# Patient Record
Sex: FEMALE | Race: WHITE | Employment: FULL TIME | URBAN - METROPOLITAN AREA
[De-identification: names, ages, dates, MRNs, and addresses within clinical notes are randomized per-mention and may not be internally consistent; named-entity substitution may affect disease eponyms.]

---

## 2017-01-12 RX ORDER — PHENTERMINE HYDROCHLORIDE 37.5 MG/1
TABLET ORAL
Qty: 100 TAB | Refills: 1 | Status: SHIPPED | OUTPATIENT
Start: 2017-01-12 | End: 2017-04-20

## 2017-04-20 DIAGNOSIS — E03.9 ACQUIRED HYPOTHYROIDISM: Primary | ICD-10-CM

## 2017-04-20 RX ORDER — LEVOTHYROXINE SODIUM 88 UG/1
TABLET ORAL
Qty: 115 TAB | Refills: 3 | Status: SHIPPED | OUTPATIENT
Start: 2017-04-20 | End: 2018-01-31 | Stop reason: DRUGHIGH

## 2017-05-02 LAB
T4 FREE SERPL-MCNC: 1.41 NG/DL (ref 0.82–1.77)
TSH SERPL DL<=0.005 MIU/L-ACNC: 1.08 UIU/ML (ref 0.45–4.5)

## 2017-05-03 ENCOUNTER — OFFICE VISIT (OUTPATIENT)
Dept: ENDOCRINOLOGY | Age: 37
End: 2017-05-03

## 2017-05-03 VITALS
WEIGHT: 171.8 LBS | HEART RATE: 61 BPM | HEIGHT: 63 IN | DIASTOLIC BLOOD PRESSURE: 71 MMHG | BODY MASS INDEX: 30.44 KG/M2 | SYSTOLIC BLOOD PRESSURE: 110 MMHG

## 2017-05-03 DIAGNOSIS — E03.9 ACQUIRED HYPOTHYROIDISM: Primary | ICD-10-CM

## 2017-05-03 DIAGNOSIS — E66.9 OBESITY (BMI 30.0-34.9): ICD-10-CM

## 2017-05-03 NOTE — MR AVS SNAPSHOT
Visit Information Date & Time Provider Department Dept. Phone Encounter #  
 5/3/2017  9:50 AM Man Gandhi MD [facility] [phone] [id number] Follow-up Instructions Return in about 9 months (around 2/3/2018). Upcoming Health Maintenance Date Due DTaP/Tdap/Td series (1 - Tdap) 2/8/2002 PAP AKA CERVICAL CYTOLOGY 2/8/2002 INFLUENZA AGE 9 TO ADULT 8/1/2017 Allergies as of 5/3/2017  Review Complete On: 5/3/2017 By: Man Gandhi MD  
  
 Severity Noted Reaction Type Reactions Erythromycin  07/28/2015    Rash Current Immunizations  Never Reviewed No immunizations on file. Not reviewed this visit You Were Diagnosed With   
  
 Codes Comments Acquired hypothyroidism    -  Primary ICD-10-CM: E03.9 ICD-9-CM: 244.9 Obesity (BMI 30.0-34.9)     ICD-10-CM: X40.9 ICD-9-CM: 278.00 Vitals BP Pulse Height(growth percentile) Weight(growth percentile) BMI Smoking Status 110/71 (BP 1 Location: Left arm, BP Patient Position: Sitting) 61 5' 3\" (1.6 m) 171 lb 12.8 oz (77.9 kg) 30.43 kg/m2 Never Smoker BMI and BSA Data Body Mass Index Body Surface Area  
 30.43 kg/m 2 1.86 m 2 Preferred Pharmacy Pharmacy Name Phone [address] [facility]. [phone] Your Updated Medication List  
  
   
This list is accurate as of: 5/3/17 10:18 AM.  Always use your most recent med list.  
  
  
  
  
 LEVOXYL 88 mcg tablet Generic drug:  levothyroxine Take 1 tab on Mon-Fri and 2 tabs on Sat and Sun--BRAND MEDICALLY NECESSARY PRENATAL PO Take  by mouth. Follow-up Instructions Return in about 9 months (around 2/3/2018). Patient Instructions 1) Your TSH is a thyroid test.  Your level is 1.08 which is normal and at goal of 0.5-2.0.   This test goes opposite of your thyroid dose and suggests your dose of levoxyl is working well so keep taking 1 tab on Mon-Fri and 2 tabs on Sat and Sun.  
 
2) Plan on having your OB's office repeat your thyroid levels in the 2nd and 3rd trimester and fax me a copy of the results and I'll e-mail you to see if we need a dose change. 3) I'll be in touch throughout the pregnancy and we'll determine a dose to settle on once you deliver to stay on until you come back after delivery. 4) Plan on going to the lab 1 week prior to your next visit and the order will already be in the system. Introducing [facility] & HEALTH SERVICES! Dear Fredo Gay: 
Thank you for requesting a [employer] account. Our records indicate that you already have an active [employer] account. You can access your account anytime at [email or web address] Did you know that you can access your hospital and ER discharge instructions at any time in [employer]? You can also review all of your test results from your hospital stay or ER visit. Additional Information If you have questions, please visit the Frequently Asked Questions section of the [employer] website at [email or web address] Remember, [employer] is NOT to be used for urgent needs. For medical emergencies, dial 911. Now available from your iPhone and Android! Please provide this summary of care documentation to your next provider. Your primary care clinician is listed as Geovany Sneed. If you have any questions after today's visit, please call [phone].

## 2017-05-03 NOTE — PROGRESS NOTES
Chief Complaint   Patient presents with    Thyroid Problem     pcp and pharmacy confirmed     History of Present Illness: Chadwick Aguilar is a 40 y.o. female here for follow up of thyroid. Weight up 3 lbs since last visit in 11/16. Had had 3 positive pregnancy tests and due to see OB's office next week. Has been off the phentermine when she was concerned she could be pregnant and we e-mailed about her pregnancy 2 weeks ago and I told her to take 1 tab on Mon-Fri and 2 tabs on Sat and Sun and has been doing this. She has missed about 5-7 days when she lost her bottle before e-mailing me on 4/20/17 but aside from this, has gotten her dose everyday. No morning sickness. Has been more tired. Has had more constipated and is taking colace to help. Has been on pnv since 4/9/17 and takes this in the morning and levoxyl is at night. No chest pain or palpitations. No hair loss or dry skin. Has felt her hands and feet and nose feel cold but her body can feel normal.    Current Outpatient Prescriptions   Medication Sig    PNV HT.71/RASLVPS FUM/FOLIC AC (PRENATAL PO) Take  by mouth.  LEVOXYL 88 mcg tablet Take 1 tab on Mon-Fri and 2 tabs on Sat and Sun--BRAND MEDICALLY NECESSARY     No current facility-administered medications for this visit. Allergies   Allergen Reactions    Erythromycin Rash     Review of Systems:  - Cardiovascular: no chest pain  - Neurological: no tremors  - Integumentary: skin is normal    Physical Examination:  Blood pressure 110/71, pulse 61, height 5' 3\" (1.6 m), weight 171 lb 12.8 oz (77.9 kg).   - General: pleasant, no distress, good eye contact   - Neck: small goiter  - Cardiovascular: regular, normal rate, nl s1 and s2, no m/r/g   - Integumentary: skin is normal, no edema  - Neurological: reflexes 2+ at biceps, no tremors  - Psychiatric: normal mood and affect    Data Reviewed:   Component      Latest Ref Rng & Units 5/1/2017 5/1/2017           4:30 PM  4:30 PM   TSH      0.450 - 4.500 uIU/mL  1.080   T4, Free      0.82 - 1.77 ng/dL 1.41        Assessment/Plan:     1. Acquired hypothyroidism: When she was 16, recalls having a lot of fatigue and was checked and found to have hypothyroidism and has been on medication ever since. Had been on levothyroxine 50 mcg daily for years and may have been on brand levoxyl as a teenager but not recently. Was taking this at the same time as her calcium and her OCP and also drinks coffee within 30 minutes. TSH was 2.59 in 7/15 and TPO ab was normal.  Increased her dose to 75 mcg daily and advised her to move the thyroid med away from the calcium and coffee to improve absorption and she has been taking at bedtime. TSH down to 1.36 in 1/16 and changed to Levoxyl and TSH 1.57 in 7/16. Down to 1.49 in 11/16 so increased dose to 88 mcg daily to try and get TSH < 1 to help with conception and became pregnant in 4/17 and I increased her dose to 1 tab on Mon-Fri and 2 tabs on Sat-Sun and TSH 1.08 in 5/17 so will stay on this dose and ask her OB's office to repeat her labs in the 2nd and 3rd trimester and fax me the results to see if a dose change is needed during pregnancy. - check TSH and free T4 in 2nd and 3rd trimester and prior to next visit  - cont levoxyl 88 mcg 1 tab on Mon-Fri and 2 tabs on Sat-Sun         2. Obesity (BMI 30.0-34. 9): Had lost 12 lbs with starting phentermine in 7/16 but gained some back over Thanksgiving. Has stopped since being pregnant in 4/17.  - off phentermine 1/2 of 37.5 mg twice daily        Patient Instructions   1) Your TSH is a thyroid test.  Your level is 1.08 which is normal and at goal of 0.5-2.0.   This test goes opposite of your thyroid dose and suggests your dose of levoxyl is working well so keep taking 1 tab on Mon-Fri and 2 tabs on Sat and Sun.     2) Plan on having your OB's office repeat your thyroid levels in the 2nd and 3rd trimester and fax me a copy of the results and I'll e-mail you to see if we need a dose change. 3) I'll be in touch throughout the pregnancy and we'll determine a dose to settle on once you deliver to stay on until you come back after delivery. 4) Plan on going to the lab 1 week prior to your next visit and the order will already be in the system. Follow-up Disposition:  Return in about 9 months (around 2/3/2018).     Copy sent to:  Dr. Manjeet Jeffries Page [phone]

## 2017-05-03 NOTE — PATIENT INSTRUCTIONS
1) Your TSH is a thyroid test.  Your level is 1.08 which is normal and at goal of 0.5-2.0. This test goes opposite of your thyroid dose and suggests your dose of levoxyl is working well so keep taking 1 tab on Mon-Fri and 2 tabs on Sat and Sun.     2) Plan on having your OB's office repeat your thyroid levels in the 2nd and 3rd trimester and fax me a copy of the results and I'll e-mail you to see if we need a dose change. 3) I'll be in touch throughout the pregnancy and we'll determine a dose to settle on once you deliver to stay on until you come back after delivery. 4) Plan on going to the lab 1 week prior to your next visit and the order will already be in the system.

## 2018-01-23 ENCOUNTER — TELEPHONE (OUTPATIENT)
Dept: ENDOCRINOLOGY | Age: 38
End: 2018-01-23

## 2018-01-23 DIAGNOSIS — E03.9 ACQUIRED HYPOTHYROIDISM: Primary | ICD-10-CM

## 2018-01-23 NOTE — TELEPHONE ENCOUNTER
Patient was notified that her labs have been ordered and to give Dr. Leandro Huitron name when she goes there. She stated okay and voiced understanding.

## 2018-01-23 NOTE — TELEPHONE ENCOUNTER
----- Message from Brandon Kelley sent at 1/23/2018  8:34 AM EST -----  Regarding: Dr. Riccardo Arce request for her blood work order to come in the mail so that she can go to and have it done before her appt on 1/31. Her best contact number is [phone].

## 2018-01-31 ENCOUNTER — OFFICE VISIT (OUTPATIENT)
Dept: ENDOCRINOLOGY | Age: 38
End: 2018-01-31

## 2018-01-31 VITALS
HEIGHT: 63 IN | BODY MASS INDEX: 32.46 KG/M2 | WEIGHT: 183.2 LBS | SYSTOLIC BLOOD PRESSURE: 107 MMHG | DIASTOLIC BLOOD PRESSURE: 71 MMHG | HEART RATE: 65 BPM

## 2018-01-31 DIAGNOSIS — E03.9 ACQUIRED HYPOTHYROIDISM: Primary | ICD-10-CM

## 2018-01-31 DIAGNOSIS — E66.9 OBESITY (BMI 30.0-34.9): ICD-10-CM

## 2018-01-31 LAB
T4 FREE SERPL-MCNC: 1.36 NG/DL (ref 0.82–1.77)
TSH SERPL DL<=0.005 MIU/L-ACNC: 0.46 UIU/ML (ref 0.45–4.5)

## 2018-01-31 RX ORDER — LEVOTHYROXINE SODIUM 112 UG/1
112 TABLET ORAL
Qty: 90 TAB | Refills: 3 | Status: SHIPPED | OUTPATIENT
Start: 2018-01-31 | End: 2018-07-25 | Stop reason: SDUPTHER

## 2018-01-31 NOTE — PROGRESS NOTES
Chief Complaint   Patient presents with    Thyroid Problem     pcp and pharmacy confirmed     History of Present Illness: Noé Haddad is a 40 y.o. female here for follow up of thyroid. Weight up 12 lbs since last visit in 5/17. She had a miscarriage at the end of May and then became pregnant again and miscarried in mid-October. Had some type of saline exam of her uterus that didn't show any abnormalities in the uterine wall to explain her miscarriages. Has continued on the same dose of levoxyl since last visit and taking the pill at bedtime. Extremities and nose can remain cold but this is unchanged. Bowels are regular. No hair loss or brittle nails or dry skin. Is currently ovulating and she and her  are still trying to conceive. Current Outpatient Prescriptions   Medication Sig    DOCOSAHEXANOIC ACID/EPA (FISH OIL PO) Take 1,000 mg by mouth. 8 tablets daily    PNV OR.17/GNCNROA FUM/FOLIC AC (PRENATAL PO) Take  by mouth.  LEVOXYL 88 mcg tablet Take 1 tab on Mon-Fri and 2 tabs on Sat and Sun--BRAND MEDICALLY NECESSARY     No current facility-administered medications for this visit. Allergies   Allergen Reactions    Erythromycin Rash     Review of Systems:  - Cardiovascular: no chest pain  - Neurological: no tremors  - Integumentary: skin is normal    Physical Examination:  Blood pressure 107/71, pulse 65, height 5' 3\" (1.6 m), weight 183 lb 3.2 oz (83.1 kg).   - General: pleasant, no distress, good eye contact   - Neck: small goiter  - Cardiovascular: regular, normal rate, nl s1 and s2, no m/r/g   - Respiratory: clear to auscultation bilaterally   - Integumentary: skin is normal, no edema  - Neurological: reflexes 2+ at biceps, no tremors  - Psychiatric: normal mood and affect    Data Reviewed:   Component      Latest Ref Rng & Units 1/30/2018 1/30/2018           9:18 AM  9:18 AM   T4, Free      0.82 - 1.77 ng/dL  1.36   TSH      0.450 - 4.500 uIU/mL 0.464        Assessment/Plan: 1. Acquired hypothyroidism: When she was 16, recalls having a lot of fatigue and was checked and found to have hypothyroidism and has been on medication ever since. Had been on levothyroxine 50 mcg daily for years and may have been on brand levoxyl as a teenager but not recently. Was taking this at the same time as her calcium and her OCP and also drinks coffee within 30 minutes. TSH was 2.59 in 7/15 and TPO ab was normal.  Increased her dose to 75 mcg daily and advised her to move the thyroid med away from the calcium and coffee to improve absorption and she has been taking at bedtime. TSH down to 1.36 in 1/16 and changed to Levoxyl and TSH 1.57 in 7/16. Down to 1.49 in 11/16 so increased dose to 88 mcg daily to try and get TSH < 1 to help with conception and became pregnant in 4/17 and I increased her dose to 1 tab on Mon-Fri and 2 tabs on Sat-Sun and TSH 1.08 in 5/17 so had her stay on this dose but then miscarried at the end of 5/17. Got pregnant again in 9/17 and miscarried in 10/17. TSH down to 0.46 in 1/18 so her current dose is still correct and not the reason for her miscarriages. Will change to 112 mcg tabs to take 1 tab daily to get a consistent dose in her system and allow for titration on the weekends when she becomes pregnant again. - check TSH and free T4 prior to next visit  - change levoxyl to 112 mcg at bedtime        2. Obesity (BMI 30.0-34. 9): Had lost 12 lbs with starting phentermine in 7/16 but gained some back over Thanksgiving. Has stopped since being pregnant in 4/17. Will stay off while still trying to conceive. - off phentermine 1/2 of 37.5 mg twice daily        Patient Instructions   1) TSH is a thyroid test.  Your level is 0.46 which is normal and at goal of 0.45-2.0. This test goes opposite of your thyroid dose and suggests your dose of levoxyl is perfect but for consistency of daily dosing I will change to the 112 mcg tabs to take 1 tab 7 days a week.  This will be ready for  at the pharmacy today. 2) When you become pregnant again, plan on taking an additional tab on Sat and Sun and let me know and we can determine when to repeat your levels. 3) There is currently an order in the [employer] system to use to check your next set of labs and just ask to have them drawn under my name. Follow-up Disposition:  Return in about 6 months (around 7/31/2018).     Copy sent to:  Dr. Bryanna Bates Page [phone]

## 2018-01-31 NOTE — PATIENT INSTRUCTIONS
1) TSH is a thyroid test.  Your level is 0.46 which is normal and at goal of 0.45-2.0. This test goes opposite of your thyroid dose and suggests your dose of levoxyl is perfect but for consistency of daily dosing I will change to the 112 mcg tabs to take 1 tab 7 days a week. This will be ready for  at the pharmacy today. 2) When you become pregnant again, plan on taking an additional tab on Sat and Sun and let me know and we can determine when to repeat your levels. 3) There is currently an order in the [employer] system to use to check your next set of labs and just ask to have them drawn under my name.

## 2018-01-31 NOTE — MR AVS SNAPSHOT
[address] 
[phone] Patient: [facility] MRN: [id number] UPH:[id number] Visit Information Date & Time Provider Department Dept. Phone Encounter #  
 1/31/2018  3:10 PM Zain Salazar, [address] [facility] [phone] Follow-up Instructions Return in about 6 months (around 7/31/2018). Upcoming Health Maintenance Date Due DTaP/Tdap/Td series (1 - Tdap) 2/8/2002 PAP AKA CERVICAL CYTOLOGY 2/8/2002 Influenza Age 5 to Adult 8/1/2017 Allergies as of 1/31/2018  Review Complete On: 1/31/2018 By: Zain Salazar MD  
  
 Severity Noted Reaction Type Reactions Erythromycin  07/28/2015    Rash Current Immunizations  Never Reviewed No immunizations on file. Not reviewed this visit You Were Diagnosed With   
  
 Codes Comments Acquired hypothyroidism    -  Primary ICD-10-CM: E03.9 ICD-9-CM: 244.9 Obesity (BMI 30.0-34.9)     ICD-10-CM: Z15.0 ICD-9-CM: 278.00 Vitals BP Pulse Height(growth percentile) Weight(growth percentile) BMI Smoking Status 107/71 65 5' 3\" (1.6 m) 183 lb 3.2 oz (83.1 kg) 32.45 kg/m2 Never Smoker Vitals History BMI and BSA Data Body Mass Index Body Surface Area  
 32.45 kg/m 2 1.92 m 2 Preferred Pharmacy Pharmacy Name Phone [address] [facility] AT [facility] 89. [phone] Your Updated Medication List  
  
   
This list is accurate as of: 1/31/18  4:30 PM.  Always use your most recent med list.  
  
  
  
  
 FISH OIL PO Take 1,000 mg by mouth. 8 tablets daily LEVOXYL 112 mcg tablet Generic drug:  levothyroxine Take 1 Tab by mouth Daily (before breakfast). BRAND MEDICALLY NECESSARY PRENATAL PO Take  by mouth. Prescriptions Sent to Pharmacy Refills LEVOXYL 112 mcg tablet 3 Sig: Take 1 Tab by mouth Daily (before breakfast). BRAND MEDICALLY NECESSARY Class: Normal  
 Pharmacy: [email or web address] Amb[facility]io Condon  #: [phone] Route: Oral  
  
We Performed the Following T4, FREE [id number] CPT(R)] TSH 3RD GENERATION [41182 CPT(R)] Follow-up Instructions Return in about 6 months (around 7/31/2018). Patient Instructions 1) TSH is a thyroid test.  Your level is 0.46 which is normal and at goal of 0.45-2.0. This test goes opposite of your thyroid dose and suggests your dose of levoxyl is perfect but for consistency of daily dosing I will change to the 112 mcg tabs to take 1 tab 7 days a week. This will be ready for  at the pharmacy today. 2) When you become pregnant again, plan on taking an additional tab on Sat and Sun and let me know and we can determine when to repeat your levels. 3) There is currently an order in the [employer] system to use to check your next set of labs and just ask to have them drawn under my name. Introducing [facility] & HEALTH SERVICES! Dear Linn Cooney: 
Thank you for requesting a [employer] account. Our records indicate that you already have an active [employer] account. You can access your account anytime at [email or web address] Did you know that you can access your hospital and ER discharge instructions at any time in [employer]? You can also review all of your test results from your hospital stay or ER visit. Additional Information If you have questions, please visit the Frequently Asked Questions section of the [employer] website at [email or web address] Remember, [employer] is NOT to be used for urgent needs. For medical emergencies, dial 911. Now available from your iPhone and Android! Please provide this summary of care documentation to your next provider. Your primary care clinician is listed as Maryellen Peña. If you have any questions after today's visit, please call [phone].

## 2018-02-03 DIAGNOSIS — E03.9 ACQUIRED HYPOTHYROIDISM: ICD-10-CM

## 2018-02-03 DIAGNOSIS — E66.9 OBESITY (BMI 30.0-34.9): ICD-10-CM

## 2018-03-29 DIAGNOSIS — E66.9 OBESITY (BMI 30.0-34.9): Primary | ICD-10-CM

## 2018-04-12 ENCOUNTER — HOSPITAL ENCOUNTER (OUTPATIENT)
Dept: NUTRITION | Age: 38
Discharge: HOME | End: 2018-04-12

## 2018-06-27 ENCOUNTER — HOSPITAL ENCOUNTER (OUTPATIENT)
Dept: PHYSICAL THERAPY | Age: 38
Discharge: HOME | End: 2018-06-27
Payer: COMMERCIAL

## 2018-06-27 PROCEDURE — 97110 THERAPEUTIC EXERCISES: CPT | Performed by: PHYSICAL THERAPIST

## 2018-06-27 PROCEDURE — 97161 PT EVAL LOW COMPLEX 20 MIN: CPT | Performed by: PHYSICAL THERAPIST

## 2018-06-27 NOTE — PROGRESS NOTES
PT INITIAL EVALUATION NOTE 2-15    Patient Name: Delfin Morrow  Date:2018  : 1980  [x]  Patient  Verified  Payor: BLUE CECE / Plan: Siena Hickman [id number] PPO / Product Type: PPO /    In time:845a  Out time:940a  Total Treatment Time (min): 55  Visit #: 1     Treatment Area: Right shoulder pain [M25.511]    SUBJECTIVE  Pain Level (0-10 scale): 5/10  Any medication changes, allergies to medications, adverse drug reactions, diagnosis change, or new procedure performed?: [] No    [x] Yes (see summary sheet for update)  Subjective:     End of 2018 began to notice onset of right shoulder pain at rest, during activity and after activity depending. She stopped   She saw Dr Antoinette Ugalde and did some dry needling with slight success but then pain would return. Stopped doing kettle bell work due to pain and golfing. She does still work out with cardio and some resistance activity with some pain but she just pushes through. 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 MRI Indicate: Subscapularis, supraspinatus and infraspinatus tendonopathy, intersubstance subscapularis tear and AC degenerative changes    Right hand dominant    OBJECTIVE  Posture:  Rounded shoulder protracted scapula  Other Observations:  FMS Shoulder Mobility test: R 1   L 1  Functional  and Pinch:  --  Palpation: tenderness at Right supraspinatus, infraspinatus and subscapularis distal tendons, Ac joint    Right Shoulder ROM:  AROM   PROM   Flexion   155   120 P!    Extension  --   --   Abduction  155   160   Adduction  --   --   IR   Hand to T10  90   ER   Hand to T2  90    Joint Mobility Assessment: Glenohumeral: normal      Acromioclavicular: normal      Sternoclavicular: normal    Flexibility: --    UPPER QUARTER   MUSCLE STRENGTH  KEY       R  L  0 - No Contraction   Flexion  5  5  1 - Trace    Extension 5  5  2 - Poor    Abduction 5 P!  5  3 - Fair     IR  5  5  4 - Good    ER  5  5  5 - Normal       Neurological: Reflexes / Sensations: normal  Special Tests: Neer Impingement: positive  Roach-Daryl: negative      Scapular Reposition: negative Shoulder Abduction: negative     AC Crossover: positive        15 min Therapeutic Exercise:  [x] See flow sheet : demonstration and preparation of HEP   Rationale: increase ROM, increase strength, improve coordination, improve balance and increase proprioception to improve the patients ability to reach overhead            With   [x] TE   [] TA   [] neuro   [] other: Patient Education: [x] Review HEP    [] Progressed/Changed HEP based on:   [] positioning   [] body mechanics   [] transfers   [] heat/ice application    [] other:        Other Objective/Functional Measures: --    Pain Level (0-10 scale) post treatment: 5/10      ASSESSMENT:      [x]  See Plan of Care      Ken Trejo PT, DPT 6/27/2018  8:54 AM

## 2018-06-28 NOTE — PROGRESS NOTES
[facility]  [id number] [address], [address] [place] [address]  Phone: [phone]  Fax: [phone]    Plan of Care/Statement of Necessity for Physical Therapy Services  2-15    Patient name: Harl Carrel  : 1980  Provider#: [id number]  Referral source: Referred, Self, MD; Radha Wells DC      Medical/Treatment Diagnosis: Right shoulder pain [M25.511]     Prior Hospitalization: see medical history     Comorbidities: none  Prior Level of Function: complete 20 minutes of exercise at least 3 times a week  Medications: Verified on Patient Summary List    Start of Care: 2018     Onset Date: 2018       The Plan of Care and following information is based on the information from the initial evaluation. Assessment/ bradshaw information: 41 y.o. Female with Right Shoulder pain secondary Subscapularis, supraspinatus and infraspinatus tendonopathy, intersubstance subscapularis tear and AC degenerative changes in presence of reduced rotator cuff and scapular stabilizer muscle performance and reduced shoulder mobility.     Evaluation Complexity History LOW Complexity : Zero comorbidities / personal factors that will impact the outcome / POC; Examination HIGH Complexity : 4+ Standardized tests and measures addressing body structure, function, activity limitation and / or participation in recreation  ;Presentation LOW Complexity : Stable, uncomplicated  ;Clinical Decision Making --  Overall Complexity Rating: LOW     Problem List: pain affecting function, decrease ROM, decrease strength, decrease ADL/ functional abilitiies, decrease activity tolerance and decrease flexibility/ joint mobility   Treatment Plan may include any combination of the following: Therapeutic exercise, Therapeutic activities, Neuromuscular re-education, Physical agent/modality, Gait/balance training, Manual therapy, Patient education and Self Care training  Patient / Family readiness to learn indicated by: asking questions  Persons(s) to be included in education: patient (P)  Barriers to Learning/Limitations: None  Patient Goal (s): resume use of right shoulder without pain  Patient Self Reported Health Status: good  Rehabilitation Potential: good    Long Term Goals: To be accomplished in 4-8 weeks:  1) Pt will be able to Reach above shoulder level without increase of pain  2) Pt will be able to reach behind back without pain  3) Pt will be able to lift >/= 30 lbs without shoulder pain  4) Pt will be able to sleep on involved side without waking due to pain  5) Pt will be independent with HEP      Frequency / Duration: Patient to be seen 2 times per week for 4-8 weeks. Patient/ Caregiver education and instruction: activity modification and exercises    [x]  Plan of care has been reviewed with KRISTI Cunningham PT, DPT 6/27/2018 11:21 PM    ________________________________________________________________________    I certify that the above Therapy Services are being furnished while the patient is under my care. I agree with the treatment plan and certify that this therapy is necessary.     [de-identified] Signature:____________________  Date:____________Time: _________

## 2018-07-03 ENCOUNTER — HOSPITAL ENCOUNTER (OUTPATIENT)
Dept: PHYSICAL THERAPY | Age: 38
Discharge: HOME | End: 2018-07-03
Payer: COMMERCIAL

## 2018-07-03 PROCEDURE — 97140 MANUAL THERAPY 1/> REGIONS: CPT | Performed by: PHYSICAL THERAPIST

## 2018-07-03 PROCEDURE — 97110 THERAPEUTIC EXERCISES: CPT | Performed by: PHYSICAL THERAPIST

## 2018-07-03 NOTE — PROGRESS NOTES
PT DAILY TREATMENT NOTE - [facility] 2-15    Patient Name: Patricio Gowers  Date:7/3/2018  : 1980  [x]  Patient  Verified  Payor: BLUE CROSS / Plan: Siean Hickman [id number] PPO / Product Type: PPO /    In time:200p  Out time:300p  Total Treatment Time (min): 60  Total Timed Codes (min): 60  1:1 Treatment Time ( only): --   Visit #: 2     Treatment Area: Pain in right shoulder [M25.511]    SUBJECTIVE  Pain Level (0-10 scale): 4/10  Any medication changes, allergies to medications, adverse drug reactions, diagnosis change, or new procedure performed?: [x] No    [] Yes (see summary sheet for update)  Subjective functional status/changes:   [] No changes reported  Pt states that she has noticed some improvement in pain with activity since she has done her HEP.     OBJECTIVE    Modality rationale: decrease inflammation and decrease pain to improve the patients ability to reaching over head   Min Type Additional Details    [] Estim: []Att   []Unatt        []TENS instruct                  []IFC  []Premod   []NMES                     []Other:  []w/US   []w/ice   []w/heat  Position:  Location:    []  Traction: [] Cervical       []Lumbar                       [] Prone          []Supine                       []Intermittent   []Continuous Lbs:  [] before manual  [] after manual  []w/heat    []  Ultrasound: []Continuous   [] Pulsed at:                           []1MHz   []3MHz Location:  W/cm2:    [] Paraffin         Location:   []w/heat   To go [x]  Ice     []  Heat  []  Ice massage Position:  Location:    []  Laser  []  Other: Position:  Location:      []  Vasopneumatic Device Pressure:       [] lo [] med [] hi   Temperature:      [x] Skin assessment post-treatment:  [x]intact []redness- no adverse reaction    []redness  adverse reaction:     45 min Therapeutic Exercise:  [x] See flow sheet :   Rationale: increase ROM, increase strength, improve coordination, improve balance and increase proprioception to improve the patients ability to reaching overhead      15 min Manual Therapy: Cross-friction massage to supraspinatus, infraspinatus and subscapularis tendons, superior G-H mob during shoulder Flexion and Abduction movement    Rationale: decrease pain, increase ROM and increase tissue extensibility to improve the patients ability to reaching overhead      With   [] TE   [] TA   [] neuro   [] other: Patient Education: [x] Review HEP    [] Progressed/Changed HEP based on:   [] positioning   [] body mechanics   [] transfers   [] heat/ice application    [] other:      Other Objective/Functional Measures: --     Pain Level (0-10 scale) post treatment: 1/10    ASSESSMENT/Changes in Function:   Able to achieve shoulder Flexon without pain when applied superior G-H mobilization during PROM. Patient will continue to benefit from skilled PT services to modify and progress therapeutic interventions, address functional mobility deficits, address ROM deficits, address strength deficits, analyze and address soft tissue restrictions, analyze and cue movement patterns, analyze and modify body mechanics/ergonomics and assess and modify postural abnormalities to attain remaining goals. []  See Plan of Care  []  See progress note/recertification  []  See Discharge Summary         Progress towards goals / Updated goals:  Long Term Goals:  To be accomplished in 4-8 weeks:  1) Pt will be able to Reach above shoulder level without increase of pain  2) Pt will be able to reach behind back without pain  3) Pt will be able to lift >/= 30 lbs without shoulder pain  4) Pt will be able to sleep on involved side without waking due to pain  5) Pt will be independent with HEP                   PLAN  [x]  Upgrade activities as tolerated     [x]  Continue plan of care  []  Update interventions per flow sheet       []  Discharge due to:_  []  Other:_      Shay Louie, PT, DPT 7/3/2018  3:31 PM

## 2018-07-06 ENCOUNTER — HOSPITAL ENCOUNTER (OUTPATIENT)
Dept: PHYSICAL THERAPY | Age: 38
Discharge: HOME | End: 2018-07-06
Payer: COMMERCIAL

## 2018-07-06 PROCEDURE — 97110 THERAPEUTIC EXERCISES: CPT | Performed by: PHYSICAL THERAPIST

## 2018-07-06 PROCEDURE — 97140 MANUAL THERAPY 1/> REGIONS: CPT | Performed by: PHYSICAL THERAPIST

## 2018-07-06 NOTE — PROGRESS NOTES
PT DAILY TREATMENT NOTE - [facility] 2-15    Patient Name: Patricio Gowers  Date:2018  : 1980  [x]  Patient  Verified  Payor: BLUE CROSS / Plan: [facility] PPO / Product Type: PPO /    In time:800a  Out time:900a  Total Treatment Time (min): 55  Total Timed Codes (min): 55  1:1 Treatment Time ( only): --   Visit #: 3     Treatment Area: Pain in right shoulder [M25.511]    SUBJECTIVE  Pain Level (0-10 scale): 5/10  Any medication changes, allergies to medications, adverse drug reactions, diagnosis change, or new procedure performed?: [x] No    [] Yes (see summary sheet for update)  Subjective functional status/changes:   [] No changes reported  Pt states that she thinks she slept on her right shoulder and now it is hurting more.     OBJECTIVE    Modality rationale: decrease inflammation and decrease pain to improve the patients ability to reaching over head   Min Type Additional Details     [] Estim: []Att   []Unatt        []TENS instruct                  []IFC  []Premod   []NMES                     []Other:  []w/US   []w/ice   []w/heat  Position:  Location:     []  Traction: [] Cervical       []Lumbar                       [] Prone          []Supine                       []Intermittent   []Continuous Lbs:  [] before manual  [] after manual  []w/heat     []  Ultrasound: []Continuous   [] Pulsed at:                           []1MHz   []3MHz Location:  W/cm2:     [] Paraffin      Location:   []w/heat   To go [x]  Ice     []  Heat  []  Ice massage Position:  Location:     []  Laser  []  Other: Position:  Location:     []  Vasopneumatic Device Pressure:       [] lo [] med [] hi   Temperature:       [x] Skin assessment post-treatment:  [x]intact []redness- no adverse reaction    []redness  adverse reaction:      40 min Therapeutic Exercise:  [x] See flow sheet :   Rationale: increase ROM, increase strength, improve coordination, improve balance and increase proprioception to improve the patients ability to reaching overhead        15 min Manual Therapy: Cross-friction massage to supraspinatus, infraspinatus and subscapularis tendons, superior and posterior G-H mob during shoulder Flexion and Abduction movement    Rationale: decrease pain, increase ROM and increase tissue extensibility to improve the patients ability to reaching overhead        With   [] TE   [] TA   [] neuro   [] other: Patient Education: [x] Review HEP    [] Progressed/Changed HEP based on:   [] positioning   [] body mechanics   [] transfers   [] heat/ice application    [] other:       Other Objective/Functional Measures: --                  Pain Level (0-10 scale) post treatment: 0/10     ASSESSMENT/Changes in Function:   Shoulder mobility did improve some following corrective shoulder mobility exercise. No pain with shoulder flexion at end of session.   Patient will continue to benefit from skilled PT services to modify and progress therapeutic interventions, address functional mobility deficits, address ROM deficits, address strength deficits, analyze and address soft tissue restrictions, analyze and cue movement patterns, analyze and modify body mechanics/ergonomics and assess and modify postural abnormalities to attain remaining goals.      []  See Plan of Care  []  See progress note/recertification  []  See Discharge Summary      Progress towards goals / Updated goals:  Long Term Goals: To be accomplished in 4-8 weeks:  1) Pt will be able to Reach above shoulder level without increase of pain  2) Pt will be able to reach behind back without pain  3) Pt will be able to lift >/= 30 lbs without shoulder pain  4) Pt will be able to sleep on involved side without waking due to pain  5) Pt will be independent with HEP                    PLAN  [x]  Upgrade activities as tolerated     [x]  Continue plan of care  []  Update interventions per flow sheet       []  Discharge due to:_  []  Other:_      Nava Men, PT, DPT 7/6/2018  7:09 AM

## 2018-07-09 ENCOUNTER — HOSPITAL ENCOUNTER (OUTPATIENT)
Dept: PHYSICAL THERAPY | Age: 38
Discharge: HOME | End: 2018-07-09
Payer: COMMERCIAL

## 2018-07-09 PROCEDURE — 97110 THERAPEUTIC EXERCISES: CPT | Performed by: PHYSICAL THERAPIST

## 2018-07-09 PROCEDURE — 97112 NEUROMUSCULAR REEDUCATION: CPT | Performed by: PHYSICAL THERAPIST

## 2018-07-09 PROCEDURE — 97140 MANUAL THERAPY 1/> REGIONS: CPT | Performed by: PHYSICAL THERAPIST

## 2018-07-09 NOTE — PROGRESS NOTES
PT DAILY TREATMENT NOTE - [facility] 2-15    Patient Name: Andra Gutiérrez  Date:2018  : 1980  [x]  Patient  Verified  Payor: BLUE CROSS / Plan: [address]ick [id number] PPO / Product Type: PPO /    In time:1230p  Out time:130p  Total Treatment Time (min): 60  Total Timed Codes (min): 60  1:1 Treatment Time ( only): --   Visit #: 4     Treatment Area: Pain in right shoulder [M25.511]    SUBJECTIVE  Pain Level (0-10 scale): 1/10  Any medication changes, allergies to medications, adverse drug reactions, diagnosis change, or new procedure performed?: [x] No    [] Yes (see summary sheet for update)  Subjective functional status/changes:   [] No changes reported  Pt states that she is noticing muscle soreness overall.     OBJECTIVE        Modality rationale: decrease inflammation and decrease pain to improve the patients ability to reaching over head   Min Type Additional Details      [] Estim: []Att   []Unatt        []TENS instruct                  []IFC  []Premod   []NMES                     []Other:  []w/US   []w/ice   []w/heat  Position:  Location:      []  Traction: [] Cervical       []Lumbar                       [] Prone          []Supine                       []Intermittent   []Continuous Lbs:  [] before manual  [] after manual  []w/heat      []  Ultrasound: []Continuous   [] Pulsed at:                           []1MHz   []3MHz Location:  W/cm2:      [] Paraffin      Location:   []w/heat   To go [x]  Ice     []  Heat  []  Ice massage Position:  Location:      []  Laser  []  Other: Position:  Location:      []  Vasopneumatic Device Pressure:       [] lo [] med [] hi   Temperature:        [x] Skin assessment post-treatment:  [x]intact []redness- no adverse reaction    []redness  adverse reaction:       35 min Therapeutic Exercise:  [x] See flow sheet :   Rationale: increase ROM, increase strength, improve coordination, improve balance and increase proprioception to improve the patients ability to reaching overhead       10 min  Neuromuscular Reeducation:  [x] See flow sheet : PNF D2 manual resistance, multidirectional resistance at 90 flex   Rationale: increase ROM, increase strength, improve coordination, improve balance and increase proprioception to improve the patients ability to reaching overhead      15 min Manual Therapy: Cross-friction massage to supraspinatus, infraspinatus and subscapularis tendons, superior and posterior G-H mob during shoulder Flexion and Abduction movement    Rationale: decrease pain, increase ROM and increase tissue extensibility to improve the patients ability to reaching overhead          With   [] TE   [] TA   [] neuro   [] other: Patient Education: [x] Review HEP    [] Progressed/Changed HEP based on:   [] positioning   [] body mechanics   [] transfers   [] heat/ice application    [] other:        Other Objective/Functional Measures: --                   Pain Level (0-10 scale) post treatment: 0/10      ASSESSMENT/Changes in Function:   Able to achieve shoulder flexion without pain following manual treatment.   Patient will continue to benefit from skilled PT services to modify and progress therapeutic interventions, address functional mobility deficits, address ROM deficits, address strength deficits, analyze and address soft tissue restrictions, analyze and cue movement patterns, analyze and modify body mechanics/ergonomics and assess and modify postural abnormalities to attain remaining goals.      []  See Plan of Care  []  See progress note/recertification  []  See Discharge Summary      Progress towards goals / Updated goals:  Long Term Goals: To be accomplished in 4-8 weeks:  1) Pt will be able to Reach above shoulder level without increase of pain  2) Pt will be able to reach behind back without pain  3) Pt will be able to lift >/= 30 lbs without shoulder pain  4) Pt will be able to sleep on involved side without waking due to pain  5) Pt will be independent with HEP                     PLAN  [x]  Upgrade activities as tolerated     [x]  Continue plan of care  []  Update interventions per flow sheet       []  Discharge due to:_  []  Other:_      Inell Ana Paula PT, DPT 7/9/2018  12:39 PM

## 2018-07-13 ENCOUNTER — HOSPITAL ENCOUNTER (OUTPATIENT)
Dept: PHYSICAL THERAPY | Age: 38
Discharge: HOME | End: 2018-07-13
Payer: COMMERCIAL

## 2018-07-13 PROCEDURE — 97112 NEUROMUSCULAR REEDUCATION: CPT | Performed by: PHYSICAL THERAPIST

## 2018-07-13 PROCEDURE — 97140 MANUAL THERAPY 1/> REGIONS: CPT | Performed by: PHYSICAL THERAPIST

## 2018-07-13 PROCEDURE — 97110 THERAPEUTIC EXERCISES: CPT | Performed by: PHYSICAL THERAPIST

## 2018-07-13 NOTE — PROGRESS NOTES
PT DAILY TREATMENT NOTE - [facility] 2-15    Patient Name: Shahram Burris  Date:2018  : 1980  [x]  Patient  Verified  Payor: BLUE CROSS / Plan: Siena Hickman [id number] PPO / Product Type: PPO /    In time:1135a  Out time:1230p  Total Treatment Time (min): 55  Total Timed Codes (min): 55  1:1 Treatment Time ( only): --   Visit #: 5     Treatment Area: Pain in right shoulder [M25.511]    SUBJECTIVE  Pain Level (0-10 scale): 0/10  Any medication changes, allergies to medications, adverse drug reactions, diagnosis change, or new procedure performed?: [x] No    [] Yes (see summary sheet for update)  Subjective functional status/changes:   [] No changes reported  Pt reports that she notices relief of pain after doing exercises but she does still have reproduction of pain with quick movements like using a fly swatter to swing a fly.     OBJECTIVE           Modality rationale: decrease inflammation and decrease pain to improve the patients ability to reaching over head   Min Type Additional Details      [] Estim: []Att   []Unatt        []TENS instruct                  []IFC  []Premod   []NMES                     []Other:  []w/US   []w/ice   []w/heat  Position:  Location:      []  Traction: [] Cervical       []Lumbar                       [] Prone          []Supine                       []Intermittent   []Continuous Lbs:  [] before manual  [] after manual  []w/heat      []  Ultrasound: []Continuous   [] Pulsed at:                           []1MHz   []3MHz Location:  W/cm2:      [] Paraffin      Location:   []w/heat   To go [x]  Ice     []  Heat  []  Ice massage Position:  Location:      []  Laser  []  Other: Position:  Location:      []  Vasopneumatic Device Pressure:       [] lo [] med [] hi   Temperature:        [x] Skin assessment post-treatment:  [x]intact []redness- no adverse reaction    []redness  adverse reaction:       35 min Therapeutic Exercise:  [x] See flow sheet :   Rationale: increase ROM, increase strength, improve coordination, improve balance and increase proprioception to improve the patients ability to reaching overhead      10 min  Neuromuscular Reeducation:  [x] See flow sheet : PNF D2 manual resistance, multidirectional resistance at 90 flex, seated shoulder flexion against manual resisted scapular protraction   Rationale: increase ROM, increase strength, improve coordination, improve balance and increase proprioception to improve the patients ability to reaching overhead      15 min Manual Therapy: Cross-friction massage to supraspinatus, infraspinatus and subscapularis tendons, superior and posterior G-H mob during shoulder Flexion and Abduction movement    Rationale: decrease pain, increase ROM and increase tissue extensibility to improve the patients ability to reaching overhead          With   [] TE   [] TA   [] neuro   [] other: Patient Education: [x] Review HEP    [] Progressed/Changed HEP based on:   [] positioning   [] body mechanics   [] transfers   [] heat/ice application    [] other:        Other Objective/Functional Measures: --                   Pain Level (0-10 scale) post treatment: 0/10      ASSESSMENT/Changes in Function:   No pain with shoulder flexion when correctly engaging serratus anterior for upward scapular rotation during flexion.   Patient will continue to benefit from skilled PT services to modify and progress therapeutic interventions, address functional mobility deficits, address ROM deficits, address strength deficits, analyze and address soft tissue restrictions, analyze and cue movement patterns, analyze and modify body mechanics/ergonomics and assess and modify postural abnormalities to attain remaining goals.      []  See Plan of Care  []  See progress note/recertification  []  See Discharge Summary      Progress towards goals / Updated goals:  Long Term Goals: To be accomplished in 4-8 weeks:  1) Pt will be able to Reach above shoulder level without increase of pain  2) Pt will be able to reach behind back without pain  3) Pt will be able to lift >/= 30 lbs without shoulder pain  4) Pt will be able to sleep on involved side without waking due to pain  5) Pt will be independent with HEP                     PLAN  [x]  Upgrade activities as tolerated     [x]  Continue plan of care  []  Update interventions per flow sheet       []  Discharge due to:_  []  Other:_      Ken Trejo, PT, DPT 7/13/2018  1:30 PM

## 2018-07-16 ENCOUNTER — HOSPITAL ENCOUNTER (OUTPATIENT)
Dept: PHYSICAL THERAPY | Age: 38
Discharge: HOME | End: 2018-07-16
Payer: COMMERCIAL

## 2018-07-16 PROCEDURE — 97140 MANUAL THERAPY 1/> REGIONS: CPT | Performed by: PHYSICAL THERAPIST

## 2018-07-16 PROCEDURE — 97110 THERAPEUTIC EXERCISES: CPT | Performed by: PHYSICAL THERAPIST

## 2018-07-16 PROCEDURE — 97112 NEUROMUSCULAR REEDUCATION: CPT | Performed by: PHYSICAL THERAPIST

## 2018-07-16 NOTE — PROGRESS NOTES
PT DAILY TREATMENT NOTE - [facility] 2-15    Patient Name: Jorge Alberto Mortensen  Date:2018  : 1980  [x]  Patient  Verified  Payor: BLUE CECE / Plan: Siena Hickman [id number] PPO / Product Type: PPO /    In time:410p  Out time:505p  Total Treatment Time (min): 55  Total Timed Codes (min): 55  1:1 Treatment Time ( only): --   Visit #: 6     Treatment Area: Pain in right shoulder [M25.511]    SUBJECTIVE  Pain Level (0-10 scale): 0/10  Any medication changes, allergies to medications, adverse drug reactions, diagnosis change, or new procedure performed?: [x] No    [] Yes (see summary sheet for update)  Subjective functional status/changes:   [] No changes reported  Pt reports that she gets really fatigued with shoulder ER movements but no pain. Does still have pain with quick movements.     OBJECTIVE  35 min Therapeutic Exercise:  [x] See flow sheet :   Rationale: increase ROM, increase strength, improve coordination, improve balance and increase proprioception to improve the patients ability to reaching overhead      10 min  Neuromuscular Reeducation:  [x] See flow sheet : PNF D1 manual resistance, multidirectional resistance at 90 flex, seated shoulder flexion against manual resisted scapular protraction   Rationale: increase ROM, increase strength, improve coordination, improve balance and increase proprioception to improve the patients ability to reaching overhead      10 min Manual Therapy: Cross-friction massage to supraspinatus, infraspinatus and subscapularis tendons, t    Rationale: decrease pain, increase ROM and increase tissue extensibility to improve the patients ability to reaching overhead          With   [] TE   [] TA   [] neuro   [] other: Patient Education: [x] Review HEP    [] Progressed/Changed HEP based on:   [] positioning   [] body mechanics   [] transfers   [] heat/ice application    [] other:        Other Objective/Functional Measures: --                   Pain Level (0-10 scale) post treatment: 0/10      ASSESSMENT/Changes in Function:   Began PNF D1 movement pattern today to prepare for golf swing this weekend. Limited D1 flexion motion to 80% full range to keep pain free.   Patient will continue to benefit from skilled PT services to modify and progress therapeutic interventions, address functional mobility deficits, address ROM deficits, address strength deficits, analyze and address soft tissue restrictions, analyze and cue movement patterns, analyze and modify body mechanics/ergonomics and assess and modify postural abnormalities to attain remaining goals.      []  See Plan of Care  []  See progress note/recertification  []  See Discharge Summary      Progress towards goals / Updated goals:  Long Term Goals: To be accomplished in 4-8 weeks:  1) Pt will be able to Reach above shoulder level without increase of pain  2) Pt will be able to reach behind back without pain  3) Pt will be able to lift >/= 30 lbs without shoulder pain  4) Pt will be able to sleep on involved side without waking due to pain  5) Pt will be independent with HEP                     PLAN  [x]  Upgrade activities as tolerated     [x]  Continue plan of care  []  Update interventions per flow sheet       []  Discharge due to:_  []  Other:_      Ruth Wood, PT, DPT 7/16/2018  5:15 PM

## 2018-07-18 ENCOUNTER — HOSPITAL ENCOUNTER (OUTPATIENT)
Dept: PHYSICAL THERAPY | Age: 38
Discharge: HOME | End: 2018-07-18
Payer: COMMERCIAL

## 2018-07-18 PROCEDURE — 97110 THERAPEUTIC EXERCISES: CPT | Performed by: PHYSICAL THERAPIST

## 2018-07-18 PROCEDURE — 97112 NEUROMUSCULAR REEDUCATION: CPT | Performed by: PHYSICAL THERAPIST

## 2018-07-18 PROCEDURE — 97140 MANUAL THERAPY 1/> REGIONS: CPT | Performed by: PHYSICAL THERAPIST

## 2018-07-18 NOTE — PROGRESS NOTES
PT DAILY TREATMENT NOTE - [facility] 2-15    Patient Name: Lisa Solitario  Date:2018  : 1980  [x]  Patient  Verified  Payor: BLUE CROSS / Plan: Siena Hickman [id number] PPO / Product Type: PPO /    In time:100p  Out time:200p  Total Treatment Time (min): 60  Total Timed Codes (min): 60  1:1 Treatment Time ( only): --   Visit #: 7     Treatment Area: Pain in right shoulder [M25.511]    SUBJECTIVE  Pain Level (0-10 scale): 2/10  Any medication changes, allergies to medications, adverse drug reactions, diagnosis change, or new procedure performed?: [x] No    [] Yes (see summary sheet for update)  Subjective functional status/changes:   [] No changes reported  Pt states that she did bent over shoulder ER at gym and increased right shoulder pain.     OBJECTIVE  35 min Therapeutic Exercise:  [x] See flow sheet :   Rationale: increase ROM, increase strength, improve coordination, improve balance and increase proprioception to improve the patients ability to reaching overhead      15 min  Neuromuscular Reeducation:  [x] See flow sheet : PNF D1 manual resistance, multidirectional resistance at 90 flex, seated shoulder flexion against manual resisted scapular protraction   Rationale: increase ROM, increase strength, improve coordination, improve balance and increase proprioception to improve the patients ability to reaching overhead      10 min Manual Therapy: Cross-friction massage to supraspinatus, infraspinatus and subscapularis tendons, t    Rationale: decrease pain, increase ROM and increase tissue extensibility to improve the patients ability to reaching overhead          With   [] TE   [] TA   [] neuro   [] other: Patient Education: [x] Review HEP    [] Progressed/Changed HEP based on:   [] positioning   [] body mechanics   [] transfers   [] heat/ice application    [] other:        Other Objective/Functional Measures: --                   Pain Level (0-10 scale) post treatment: 0/10      ASSESSMENT/Changes in Function:   Advanced PNF D2 and D1 speed today. Also focused on STM to infraspinatus muscle and passive stretching.   Patient will continue to benefit from skilled PT services to modify and progress therapeutic interventions, address functional mobility deficits, address ROM deficits, address strength deficits, analyze and address soft tissue restrictions, analyze and cue movement patterns, analyze and modify body mechanics/ergonomics and assess and modify postural abnormalities to attain remaining goals.      []  See Plan of Care  []  See progress note/recertification  []  See Discharge Summary      Progress towards goals / Updated goals:  Long Term Goals: To be accomplished in 4-8 weeks:  1) Pt will be able to Reach above shoulder level without increase of pain  2) Pt will be able to reach behind back without pain  3) Pt will be able to lift >/= 30 lbs without shoulder pain  4) Pt will be able to sleep on involved side without waking due to pain  5) Pt will be independent with HEP                     PLAN  [x]  Upgrade activities as tolerated     [x]  Continue plan of care  []  Update interventions per flow sheet       []  Discharge due to:_  []  Other:_      Isabel Wright, PT, DPT 7/18/2018  2:57 PM

## 2018-07-19 ENCOUNTER — APPOINTMENT (OUTPATIENT)
Dept: PHYSICAL THERAPY | Age: 38
End: 2018-07-19
Payer: COMMERCIAL

## 2018-07-23 ENCOUNTER — HOSPITAL ENCOUNTER (OUTPATIENT)
Dept: PHYSICAL THERAPY | Age: 38
Discharge: HOME | End: 2018-07-23
Payer: COMMERCIAL

## 2018-07-23 PROCEDURE — 97112 NEUROMUSCULAR REEDUCATION: CPT | Performed by: PHYSICAL THERAPIST

## 2018-07-23 PROCEDURE — 97140 MANUAL THERAPY 1/> REGIONS: CPT | Performed by: PHYSICAL THERAPIST

## 2018-07-23 PROCEDURE — 97110 THERAPEUTIC EXERCISES: CPT | Performed by: PHYSICAL THERAPIST

## 2018-07-23 NOTE — PROGRESS NOTES
PT DAILY TREATMENT NOTE - [facility] 2-15    Patient Name: Mattie Blue  Date:2018  : 1980  [x]  Patient  Verified  Payor: BLUE CROSS / Plan: Siena Hickman [id number] PPO / Product Type: PPO /    In time: 405p  Out time:500p  Total Treatment Time (min): 55  Total Timed Codes (min): 55  1:1 Treatment Time ( only): --   Visit #: 8     Treatment Area: Pain in right shoulder [M25.511]    SUBJECTIVE  Pain Level (0-10 scale): 2/10  Any medication changes, allergies to medications, adverse drug reactions, diagnosis change, or new procedure performed?: [x] No    [] Yes (see summary sheet for update)  Subjective functional status/changes:   [] No changes reported  Pt states that she played 26 holes of golf over two days without pain after or during.       OBJECTIVE  35 min Therapeutic Exercise:  [x] See flow sheet :   Rationale: increase ROM, increase strength, improve coordination, improve balance and increase proprioception to improve the patients ability to reaching overhead      15 min  Neuromuscular Reeducation:  [x] See flow sheet : PNF D1 manual resistance, multidirectional resistance at 90 flex, seated shoulder flexion against manual resisted scapular protraction   Rationale: increase ROM, increase strength, improve coordination, improve balance and increase proprioception to improve the patients ability to reaching overhead      10 min Manual Therapy: Cross-friction massage to supraspinatus, infraspinatus and subscapularis tendons, t    Rationale: decrease pain, increase ROM and increase tissue extensibility to improve the patients ability to reaching overhead          With   [] TE   [] TA   [] neuro   [] other: Patient Education: [x] Review HEP    [] Progressed/Changed HEP based on:   [] positioning   [] body mechanics   [] transfers   [] heat/ice application    [] other:        Other Objective/Functional Measures: --                   Pain Level (0-10 scale) post treatment: 0/10      ASSESSMENT/Changes in Function:   Performed seated PNF D1 and D2 on exercise ball without shoulder pain.   Patient will continue to benefit from skilled PT services to modify and progress therapeutic interventions, address functional mobility deficits, address ROM deficits, address strength deficits, analyze and address soft tissue restrictions, analyze and cue movement patterns, analyze and modify body mechanics/ergonomics and assess and modify postural abnormalities to attain remaining goals.      []  See Plan of Care  []  See progress note/recertification  []  See Discharge Summary      Progress towards goals / Updated goals:  Long Term Goals: To be accomplished in 4-8 weeks:  1) Pt will be able to Reach above shoulder level without increase of pain  2) Pt will be able to reach behind back without pain  3) Pt will be able to lift >/= 30 lbs without shoulder pain  4) Pt will be able to sleep on involved side without waking due to pain  5) Pt will be independent with HEP                     PLAN  [x]  Upgrade activities as tolerated     [x]  Continue plan of care  []  Update interventions per flow sheet       []  Discharge due to:_  []  Other:_      London Mcuqeen, PT, DPT 7/23/2018  5:18 PM

## 2018-07-25 ENCOUNTER — OFFICE VISIT (OUTPATIENT)
Dept: ENDOCRINOLOGY | Age: 38
End: 2018-07-25

## 2018-07-25 VITALS
WEIGHT: 168.8 LBS | BODY MASS INDEX: 29.91 KG/M2 | SYSTOLIC BLOOD PRESSURE: 112 MMHG | HEIGHT: 63 IN | DIASTOLIC BLOOD PRESSURE: 56 MMHG | HEART RATE: 49 BPM

## 2018-07-25 DIAGNOSIS — E66.9 OBESITY (BMI 30.0-34.9): ICD-10-CM

## 2018-07-25 DIAGNOSIS — E03.9 ACQUIRED HYPOTHYROIDISM: Primary | ICD-10-CM

## 2018-07-25 LAB
T4 FREE SERPL-MCNC: 1.35 NG/DL (ref 0.82–1.77)
TSH SERPL DL<=0.005 MIU/L-ACNC: 0.32 UIU/ML (ref 0.45–4.5)

## 2018-07-25 RX ORDER — LEVOTHYROXINE SODIUM 112 UG/1
TABLET ORAL
Qty: 90 TAB | Refills: 3
Start: 2018-07-25 | End: 2018-10-10 | Stop reason: SDUPTHER

## 2018-07-25 NOTE — PATIENT INSTRUCTIONS
1) TSH is a thyroid test.  Your level is 0.31 which is just slightly low and below goal of 0.5-2.0 since you have lost 15 lbs. This test goes opposite of your thyroid dose and suggests your dose of levoxyl is slightly more than you need. I will decrease your dose to 1 tab on Mon-Sat and 1/2 tab on Sun. I updated your med list but did not send a new prescription to your pharmacy on file that has been filling this med. 2) When you become pregnant again, plan on taking an additional tab on Sat and Sun (total of 8.5 tabs/week) and let me know and we can determine when to repeat your levels.

## 2018-07-25 NOTE — MR AVS SNAPSHOT
[address] 
[phone] Patient: [facility] MRN: [id number] Visit Information Date & Time Provider Department Dept. Phone Encounter #  
 7/25/2018 11:30 AM Mykel Loya, [address] [facility] [phone] Follow-up Instructions Return in about 6 months (around 1/25/2019). Upcoming Health Maintenance Date Due DTaP/Tdap/Td series (1 - Tdap) 2/8/2002 PAP AKA CERVICAL CYTOLOGY 2/8/2002 Influenza Age 5 to Adult 8/1/2018 Allergies as of 7/25/2018  Review Complete On: 7/25/2018 By: Mykel Loya MD  
  
 Severity Noted Reaction Type Reactions Erythromycin  07/28/2015    Rash Current Immunizations  Never Reviewed No immunizations on file. Not reviewed this visit You Were Diagnosed With   
  
 Codes Comments Acquired hypothyroidism    -  Primary ICD-10-CM: E03.9 ICD-9-CM: 244.9 Obesity (BMI 30.0-34.9)     ICD-10-CM: L25.0 ICD-9-CM: 278.00 Vitals BP Pulse Height(growth percentile) Weight(growth percentile) BMI Smoking Status 112/56 (!) 49 5' 3\" (1.6 m) 168 lb 12.8 oz (76.6 kg) 29.9 kg/m2 Never Smoker Vitals History BMI and BSA Data Body Mass Index Body Surface Area  
 29.9 kg/m 2 1.85 m 2 Preferred Pharmacy Pharmacy Name Phone [address] [place] [address] [facility] [address]. [phone] Your Updated Medication List  
  
   
This list is accurate as of 7/25/18 12:24 PM.  Always use your most recent med list.  
  
  
  
  
 LEVOXYL 112 mcg tablet Generic drug:  levothyroxine Take 1 tab on Mon-Sat and 1/2 tab on Sun. BRAND MEDICALLY NECESSARY--Dose change 7/25/18--updated med list--did not send prescription to the pharmacy PRENATAL PO Take  by mouth daily. We Performed the Following T4, FREE [id number] CPT(R)] TSH 3RD GENERATION [84984 CPT(R)] Follow-up Instructions Return in about 6 months (around 1/25/2019). To-Do List   
 07/26/2018 2:30 PM  
  Appointment with Drew Gonzales, PT, DPT at [facility] ([phone])  
  
 07/30/2018 4:00 PM  
  Appointment with Drew Gonzales PT, DPT at [facility] ([phone])  
  
 08/02/2018 8:00 AM  
  Appointment with Drew Gonzales, PT, DPT at [facility] ([phone])  
  
 08/06/2018 4:00 PM  
  Appointment with Drew Gonzales PT, DPT at [facility] ([phone])  
  
 08/09/2018 8:30 AM  
  Appointment with Drew Gonzales, PT, DPT at [facility] ([phone])  
  
 08/13/2018 4:00 PM  
  Appointment with Drew Gonzales PT, DPT at [facility] ([phone])  
  
 08/16/2018 8:30 AM  
  Appointment with Drew Gonzales PT, DPT at [facility] ([phone]) Patient Instructions 1) TSH is a thyroid test.  Your level is 0.31 which is just slightly low and below goal of 0.5-2.0 since you have lost 15 lbs. This test goes opposite of your thyroid dose and suggests your dose of levoxyl is slightly more than you need. I will decrease your dose to 1 tab on Mon-Sat and 1/2 tab on Sun. I updated your med list but did not send a new prescription to your pharmacy on file that has been filling this med. 2) When you become pregnant again, plan on taking an additional tab on Sat and Sun (total of 8.5 tabs/week) and let me know and we can determine when to repeat your levels. Introducing [facility]! Dear [facility] MEDICINE: 
Thank you for requesting a [email or web address] account. Our records indicate that you already have an active [email or web address] account. You can access your account anytime at [email or web address] Did you know that you can access your hospital and ER discharge instructions at any time in [employer]? You can also review all of your test results from your hospital stay or ER visit. Additional Information If you have questions, please visit the Frequently Asked Questions section of the [employer] website at [email or web address] Remember, [employer] is NOT to be used for urgent needs. For medical emergencies, dial 911. Now available from your iPhone and Android! Please provide this summary of care documentation to your next provider. Your primary care clinician is listed as Ann Marie Auguste. If you have any questions after today's visit, please call [phone].

## 2018-07-25 NOTE — PROGRESS NOTES
Chief Complaint   Patient presents with    Thyroid Problem     pcp and pharmacy confirmed     History of Present Illness: Harl Carrel is a 41 y.o. female here for follow up of thyroid. Weight down 15 lbs since last visit in 1/18. Has been taking the pnv in the morning and the levoxyl at night. Has been cutting back on carbs and portions over the past 6 months and has been doing orange theory with interval training and weights once a week and also has been running and the elliptical.  No chest pain, palpitations, or tremors. No shortness of breath or heat intolerance. Bowels are regular. No hair loss or brittle nails. Overall energy is about the same. Still trying to conceive and hasn't had any miscarriages over the past 6 months. Went to Dr. Sofia Schreiber and underwent a salivary chromosome test and is waiting on the results. Hopes to get her weight to 155 lbs or less. Current Outpatient Prescriptions   Medication Sig    LEVOXYL 112 mcg tablet Take 1 Tab by mouth Daily (before breakfast). BRAND MEDICALLY NECESSARY    PNV AT.42/ESBSVWR FUM/FOLIC AC (PRENATAL PO) Take  by mouth daily. No current facility-administered medications for this visit. Allergies   Allergen Reactions    Erythromycin Rash     Review of Systems:  - Cardiovascular: no chest pain  - Neurological: no tremors  - Integumentary: skin is normal    Physical Examination:  Blood pressure 112/56, pulse (!) 49, height 5' 3\" (1.6 m), weight 168 lb 12.8 oz (76.6 kg).   - General: pleasant, no distress, good eye contact   - Neck: small goiter  - Cardiovascular: regular, normal rate, nl s1 and s2, no m/r/g   - Respiratory: clear to auscultation bilaterally   - Integumentary: skin is normal, no edema  - Neurological: reflexes 2+ at biceps, no tremors  - Psychiatric: normal mood and affect    Data Reviewed:   Component      Latest Ref Rng & Units 7/24/2018 7/24/2018          10:38 AM 10:38 AM   T4, Free      0.82 - 1.77 ng/dL  1.35 TSH      0.450 - 4.500 uIU/mL 0.317 (L)        Assessment/Plan:     1. Acquired hypothyroidism: When she was 16, recalls having a lot of fatigue and was checked and found to have hypothyroidism and has been on medication ever since. Had been on levothyroxine 50 mcg daily for years and may have been on brand levoxyl as a teenager but not recently. Was taking this at the same time as her calcium and her OCP and also drinks coffee within 30 minutes. TSH was 2.59 in 7/15 and TPO ab was normal.  Increased her dose to 75 mcg daily and advised her to move the thyroid med away from the calcium and coffee to improve absorption and she has been taking at bedtime. TSH down to 1.36 in 1/16 and changed to Levoxyl and TSH 1.57 in 7/16. Down to 1.49 in 11/16 so increased dose to 88 mcg daily to try and get TSH < 1 to help with conception and became pregnant in 4/17 and I increased her dose to 1 tab on Mon-Fri and 2 tabs on Sat-Sun and TSH 1.08 in 5/17 so had her stay on this dose but then miscarried at the end of 5/17. Got pregnant again in 9/17 and miscarried in 10/17. TSH down to 0.46 in 1/18 so her current dose is still correct and not the reason for her miscarriages. I changed to 112 mcg tabs to take 1 tab daily to get a consistent dose in her system and allow for titration on the weekends when she becomes pregnant again and TSH down to 0.317 in 7/18 with losing 15 lbs so will have her decrease to 6.5 tab/week  - check TSH and free T4 prior to next visit  - change levoxyl to 112 mcg 1 tab on Mon-Sat and 1/2 tab on Sun at bedtime        2. Obesity (BMI 30.0-34. 9): Had lost 12 lbs with starting phentermine in 7/16 but gained some back over Thanksgiving. Has stopped since being pregnant in 4/17 and then miscarried. Will stay off while still trying to conceive.   Wt down 15 lbs from 1/18 to 7/18.  - cont diet and exercise        Patient Instructions   1) TSH is a thyroid test.  Your level is 0.31 which is just slightly low and below goal of 0.5-2.0 since you have lost 15 lbs. This test goes opposite of your thyroid dose and suggests your dose of levoxyl is slightly more than you need. I will decrease your dose to 1 tab on Mon-Sat and 1/2 tab on Sun. I updated your med list but did not send a new prescription to your pharmacy on file that has been filling this med. 2) When you become pregnant again, plan on taking an additional tab on Sat and Sun (total of 8.5 tabs/week) and let me know and we can determine when to repeat your levels. Follow-up Disposition:  Return in about 6 months (around 1/25/2019). Copy sent to:  Dr. Oquendo Offer Page [phone]  Dr. Orlie Nyhan      Lab follow up: 10/10/18  - TSH 0.033  - FT4 1.65  - T3 111    She let me know on 8/27/18 that she is pregnant and I advised her to take an extra 2 tabs/week=8.5 tabs/week until her first set of labs were drawn. Sent her the following message through [employer]:  TSH is a thyroid test.  Your level is 0.03 which is low and below goal of 0.5-2.0. This test goes opposite of your thyroid dose and suggests your dose of levoxyl is likely too much. However this level may also be low due to being in the first trimester of pregnancy as there is normally a drop in TSH during the first trimester. To be safe, I will slightly decrease your dose to 8 tabs/week and just take 1 tab on Sunday-Friday and 2 tabs on Saturday only. I updated your med list but did not send a new prescription to your pharmacy on file that has been filling this med. Plan on having your labs repeated again sometime in the 2nd trimester (after 13 weeks) and have your labs faxed to my attention. Please let me know when these are drawn so I can be on the lookout for the results.       Lab follow up: 11/6/18  Received labs from 10/29/18 drawn at Dr. Erin Rodriguez' office:  - TSH 0.112  - Free T3 2.7  - FT4 1.43  - TPO ab 10    Sent her the following message through [employer]:  TSH is a thyroid test.  Your level is 0.112 which is still slightly low and below goal of 0.5-2.0. This test goes opposite of your thyroid dose and suggests your dose of levoxyl is still slightly more than you need. I will decrease your dose to 1 tab on Sunday-Friday and 1.5 tabs on Saturday only. I updated your med list but did not send a new prescription to your pharmacy on file that has been filling this med. Plan on having your labs repeated again in another 6-8 weeks and have them faxed to my attention. Please let me know when these are drawn so I can be on the lookout for the results. Lab follow up: 12/13/18  Received labs from Dr. Nilda Sommer drawn on 12/11/18  - TSH 0.29  - FT4 1.33    Sent her the following message through [employer]:  TSH is a thyroid test.  Your level is 0.29 which is just slightly low and below goal of 0.5-2.0 but acceptable for pregnancy as it is no longer under 0.20. This test goes opposite of your thyroid dose and suggests your dose of levoxyl is adequate and I recommend keeping your dose the same for now and repeating your labs at the beginning of your 3rd trimester when you have your glucola screen and faxing me a copy of the results. Please let me know when these are drawn so I can be on the lookout for the results.

## 2018-07-25 NOTE — LETTER
1/1/2019 8:35 AM 
 
Ms. HANNA [facility] [phone] [facility] [phone] Please go to [facility] and have your labs repeated sometime in the 1-2 weeks prior to your upcoming visit with me. Mega Long to allow at least 2 days at the minimum to ensure I get the results in time for your visit. Ethyl Ko order is already in their system and be sure to ask to have labs drawn that are under Dr. Calvin Lewiss name. Padmini Odell you have the actual lab order that I may have given you (check your glove compartment), please bring this to the lab just to be safe in case [employer]'s computer system is down. Moises Jean will review the results at your follow up visit.   
 
 
 
 
Sincerely, 
 
 
Fredrick Whitlock MD

## 2018-07-26 ENCOUNTER — HOSPITAL ENCOUNTER (OUTPATIENT)
Dept: PHYSICAL THERAPY | Age: 38
Discharge: HOME | End: 2018-07-26
Payer: COMMERCIAL

## 2018-07-26 PROCEDURE — 97110 THERAPEUTIC EXERCISES: CPT | Performed by: PHYSICAL THERAPIST

## 2018-07-26 PROCEDURE — 97140 MANUAL THERAPY 1/> REGIONS: CPT | Performed by: PHYSICAL THERAPIST

## 2018-07-26 PROCEDURE — 97112 NEUROMUSCULAR REEDUCATION: CPT | Performed by: PHYSICAL THERAPIST

## 2018-07-26 NOTE — PROGRESS NOTES
PT DAILY TREATMENT NOTE - [facility] 2-15    Patient Name: Brandt Donato  Date:2018  : 1980  [x]  Patient  Verified  Payor: BLUE CECE / Plan: Siena Hickman [id number] PPO / Product Type: PPO /    In time:235p  Out time:340p  Total Treatment Time (min): 60  Total Timed Codes (min): 60  1:1 Treatment Time ( only): --   Visit #: 9     Treatment Area: Pain in right shoulder [M25.511]    SUBJECTIVE  Pain Level (0-10 scale): 0/10  Any medication changes, allergies to medications, adverse drug reactions, diagnosis change, or new procedure performed?: [x] No    [] Yes (see summary sheet for update)  Subjective functional status/changes:   [] No changes reported  Pt reports that she noticed pain at the right shoulder (pec minor and rhomboid areas) with stretchign arms forward. Otherwise no longer noticing pain with reaching overhead slowly.     OBJECTIVE  35 min Therapeutic Exercise:  [x] See flow sheet :   Rationale: increase ROM, increase strength, improve coordination, improve balance and increase proprioception to improve the patients ability to reaching overhead      15 min  Neuromuscular Reeducation:  [x] See flow sheet : PNF D1 manual resistance, multidirectional resistance at 90 flex, seated shoulder flexion against manual resisted scapular protraction   Rationale: increase ROM, increase strength, improve coordination, improve balance and increase proprioception to improve the patients ability to reaching overhead      10 min Manual Therapy: Cross-friction massage to supraspinatus, infraspinatus and subscapularis tendons, pec minor    Rationale: decrease pain, increase ROM and increase tissue extensibility to improve the patients ability to reaching overhead          With   [] TE   [] TA   [] neuro   [] other: Patient Education: [x] Review HEP    [] Progressed/Changed HEP based on:   [] positioning   [] body mechanics   [] transfers   [] heat/ice application    [] other:        Other Objective/Functional Measures: --                   Pain Level (0-10 scale) post treatment: 0/10      ASSESSMENT/Changes in Function:   No pain with multidirectional ball stabs against external perturbations. Instructed in self STM of pec minor muscle. Consider adding LE/UE coordination through trunk activities next session.   Patient will continue to benefit from skilled PT services to modify and progress therapeutic interventions, address functional mobility deficits, address ROM deficits, address strength deficits, analyze and address soft tissue restrictions, analyze and cue movement patterns, analyze and modify body mechanics/ergonomics and assess and modify postural abnormalities to attain remaining goals.      []  See Plan of Care  []  See progress note/recertification  []  See Discharge Summary      Progress towards goals / Updated goals:  Long Term Goals: To be accomplished in 4-8 weeks:  1) Pt will be able to Reach above shoulder level without increase of pain  2) Pt will be able to reach behind back without pain  3) Pt will be able to lift >/= 30 lbs without shoulder pain  4) Pt will be able to sleep on involved side without waking due to pain  5) Pt will be independent with HEP                     PLAN  [x]  Upgrade activities as tolerated     [x]  Continue plan of care  []  Update interventions per flow sheet       []  Discharge due to:_  []  Other:_      Manuel Yee, PT, DPT 7/26/2018  4:13 PM

## 2018-07-30 ENCOUNTER — HOSPITAL ENCOUNTER (OUTPATIENT)
Dept: PHYSICAL THERAPY | Age: 38
Discharge: HOME | End: 2018-07-30
Payer: COMMERCIAL

## 2018-07-30 PROCEDURE — 97112 NEUROMUSCULAR REEDUCATION: CPT | Performed by: PHYSICAL THERAPIST

## 2018-07-30 PROCEDURE — 97140 MANUAL THERAPY 1/> REGIONS: CPT | Performed by: PHYSICAL THERAPIST

## 2018-07-30 PROCEDURE — 97110 THERAPEUTIC EXERCISES: CPT | Performed by: PHYSICAL THERAPIST

## 2018-07-30 NOTE — PROGRESS NOTES
PT DAILY TREATMENT NOTE - [facility] 2-15 Patient Name: [facility] Date:2018 : 1980 [x]  Patient  Verified Payor: BLUE CROSS / Plan: [facility]UE [facility] PPO / Product Type: PPO / In time:405p  Out time:505p Total Treatment Time (min): 60 Total Timed Codes (min): 60 
1:1 Treatment Time ( [address] only): -- Visit #: 19 Treatment Area: Pain in right shoulder [M25.511] SUBJECTIVE Pain Level (0-10 scale): 0/10 Any medication changes, allergies to medications, adverse drug reactions, diagnosis change, or new procedure performed?: [x] No    [] Yes (see summary sheet for update) Subjective functional status/changes:   [] No changes reported Pt states that she still has pain with wuick movements like an underhand toss of her phone onto her bed. OBJECTIVE 35 min Therapeutic Exercise:  [x] See flow sheet :  
Rationale: increase ROM, increase strength, improve coordination, improve balance and increase proprioception to improve the patients ability to reaching overhead 
   
15 min  Neuromuscular Reeducation:  [x] See flow sheet : 90-90 plyometric ball toss with tennis ball, multidirectional resistance at 90 flex, seated shoulder flexion against manual resisted scapular protraction Rationale: increase ROM, increase strength, improve coordination, improve balance and increase proprioception to improve the patients ability to reaching overhead 
   
10 min Manual Therapy: Cross-friction massage to supraspinatus, infraspinatus and subscapularis tendons, pec minor   
Rationale: decrease pain, increase ROM and increase tissue extensibility to improve the patients ability to reaching overhead 
   
   
With 
 [] TE 
 [] TA 
 [] neuro 
 [] other: Patient Education: [x] Review HEP   
[] Progressed/Changed HEP based on:  
[] positioning   [] body mechanics   [] transfers   [] heat/ice application   
[] other:   
   
Other Objective/Functional Measures: --              
   
Pain Level (0-10 scale) post treatment: 0/10 
   
ASSESSMENT/Changes in Function:  
Did have right posterior shoulder pain with supine 90-90 ball toss with focus on shoulder IR at full speed. Able to perform without pain under isometric and slow isokinetic resistance. Will continue to work on strength and control around 90 flex/abd and above. Patient will continue to benefit from skilled PT services to modify and progress therapeutic interventions, address functional mobility deficits, address ROM deficits, address strength deficits, analyze and address soft tissue restrictions, analyze and cue movement patterns, analyze and modify body mechanics/ergonomics and assess and modify postural abnormalities to attain remaining goals. 
   
[]  See Plan of Care 
[]  See progress note/recertification 
[]  See Discharge Summary 
   
Progress towards goals / Updated goals: 
Long Term Goals: To be accomplished in 4-8 weeks: 
1) Pt will be able to Reach above shoulder level without increase of pain 2) Pt will be able to reach behind back without pain 3) Pt will be able to lift >/= 30 lbs without shoulder pain 4) Pt will be able to sleep on involved side without waking due to pain 5) Pt will be independent with HEP                
   
PLAN [x]  Upgrade activities as tolerated     [x]  Continue plan of care 
[]  Update interventions per flow sheet      
[]  Discharge due to:_ 
[]  Other:_   
 
Manuel Yee, PT, DPT 7/30/2018  4:19 PM

## 2018-08-02 ENCOUNTER — HOSPITAL ENCOUNTER (OUTPATIENT)
Dept: PHYSICAL THERAPY | Age: 38
Discharge: HOME | End: 2018-08-02
Payer: COMMERCIAL

## 2018-08-02 PROCEDURE — 97110 THERAPEUTIC EXERCISES: CPT | Performed by: PHYSICAL THERAPIST

## 2018-08-02 PROCEDURE — 97140 MANUAL THERAPY 1/> REGIONS: CPT | Performed by: PHYSICAL THERAPIST

## 2018-08-02 NOTE — PROGRESS NOTES
PT DAILY TREATMENT NOTE - [facility] 2-15 Patient Name: [facility] Date:2018 : 1980 [x]  Patient  Verified Payor: BLUE CROSS / Plan: [place] [facility] PPO / Product Type: PPO / In [address] Total Treatment Time (min): 60 Total Timed Codes (min): 60 
1:1 Treatment Time ( [address] only): -- Visit #: 16 Treatment Area: Pain in right shoulder [M25.511] SUBJECTIVE Pain Level (0-10 scale): 0/10 Any medication changes, allergies to medications, adverse drug reactions, diagnosis change, or new procedure performed?: [x] No    [] Yes (see summary sheet for update) Subjective functional status/changes:   [] No changes reported Pt states that her shoulder still hurts with quick movement. OBJECTIVE 35 min Therapeutic Exercise:  [x] See flow sheet :  
Rationale: increase ROM, increase strength, improve coordination, improve balance and increase proprioception to improve the patients ability to reaching overhead 
   
15 min  Neuromuscular Reeducation:  [x] See flow sheet : 90-90 plyometric ball toss with tennis ball, multidirectional resistance at 90 flex, seated shoulder flexion against manual resisted scapular protraction Rationale: increase ROM, increase strength, improve coordination, improve balance and increase proprioception to improve the patients ability to reaching overhead 
   
10 min Manual Therapy: Cross-friction massage to supraspinatus, infraspinatus and subscapularis tendons, pec minor   
Rationale: decrease pain, increase ROM and increase tissue extensibility to improve the patients ability to reaching overhead 
   
   
With 
 [] TE 
 [] TA 
 [] neuro 
 [] other: Patient Education: [x] Review HEP   
[] Progressed/Changed HEP based on:  
[] positioning   [] body mechanics   [] transfers   [] heat/ice application   
[] other:   
   
Other Objective/Functional Measures: --              
   
Pain Level (0-10 scale) post treatment: 0/10 
   
ASSESSMENT/Changes in Function:  
 
Patient will continue to benefit from skilled PT services to modify and progress therapeutic interventions, address functional mobility deficits, address ROM deficits, address strength deficits, analyze and address soft tissue restrictions, analyze and cue movement patterns, analyze and modify body mechanics/ergonomics and assess and modify postural abnormalities to attain remaining goals. 
   
[]  See Plan of Care 
[]  See progress note/recertification 
[]  See Discharge Summary 
   
Progress towards goals / Updated goals: 
Long Term Goals: To be accomplished in 4-8 weeks: 
1) Pt will be able to Reach above shoulder level without increase of pain 2) Pt will be able to reach behind back without pain 3) Pt will be able to lift >/= 30 lbs without shoulder pain 4) Pt will be able to sleep on involved side without waking due to pain 5) Pt will be independent with HEP                
   
PLAN [x]  Upgrade activities as tolerated     [x]  Continue plan of care 
[]  Update interventions per flow sheet      
[]  Discharge due to:_ 
[]  Other:_   
 
Renée Moore PT, DPT 8/2/2018  4:16 PM

## 2018-08-06 ENCOUNTER — HOSPITAL ENCOUNTER (OUTPATIENT)
Dept: PHYSICAL THERAPY | Age: 38
Discharge: HOME | End: 2018-08-06
Payer: COMMERCIAL

## 2018-08-06 PROCEDURE — 97110 THERAPEUTIC EXERCISES: CPT | Performed by: PHYSICAL THERAPIST

## 2018-08-06 PROCEDURE — 97140 MANUAL THERAPY 1/> REGIONS: CPT | Performed by: PHYSICAL THERAPIST

## 2018-08-06 NOTE — PROGRESS NOTES
PT DAILY TREATMENT NOTE - [facility] 2-15    Patient Name: Kristy Valdez  Date:2018  : 1980  [x]  Patient  Verified  Payor: BLUE CECE / Plan: [id number] PPO / Product Type: PPO /    In time:405p  Out time:505p  Total Treatment Time (min): 60  Total Timed Codes (min): 60  1:1 Treatment Time ( only): --   Visit #: 12     Treatment Area: Pain in right shoulder [M25.511]    SUBJECTIVE  Pain Level (0-10 scale): 0/10  Any medication changes, allergies to medications, adverse drug reactions, diagnosis change, or new procedure performed?: [x] No    [] Yes (see summary sheet for update)  Subjective functional status/changes:   [] No changes reported  Pt states that she completed 18 holes of golf without pain during. Does still get pain reaching across her body.     OBJECTIVE  35 min Therapeutic Exercise:  [x] See flow sheet :   Rationale: increase ROM, increase strength, improve coordination, improve balance and increase proprioception to improve the patients ability to reaching overhead      15 min  Neuromuscular Reeducation:  [x] See flow sheet : 90-90 plyometric ball toss with tennis ball, multidirectional resistance at 90 flex, seated shoulder flexion against manual resisted scapular protraction   Rationale: increase ROM, increase strength, improve coordination, improve balance and increase proprioception to improve the patients ability to reaching overhead      10 min Manual Therapy: Cross-friction massage to supraspinatus, infraspinatus and subscapularis tendons, pec minor    Rationale: decrease pain, increase ROM and increase tissue extensibility to improve the patients ability to reaching overhead          With   [] TE   [] TA   [] neuro   [] other: Patient Education: [x] Review HEP    [] Progressed/Changed HEP based on:   [] positioning   [] body mechanics   [] transfers   [] heat/ice application    [] other:        Other Objective/Functional Measures: --                   Pain Level (0-10 scale) post treatment: 0/10      ASSESSMENT/Changes in Function:    advanced shoulder horizontal adduction after more aggressive passive stretching and STM.   Patient will continue to benefit from skilled PT services to modify and progress therapeutic interventions, address functional mobility deficits, address ROM deficits, address strength deficits, analyze and address soft tissue restrictions, analyze and cue movement patterns, analyze and modify body mechanics/ergonomics and assess and modify postural abnormalities to attain remaining goals.      []  See Plan of Care  []  See progress note/recertification  []  See Discharge Summary      Progress towards goals / Updated goals:  Long Term Goals: To be accomplished in 4-8 weeks:  1) Pt will be able to Reach above shoulder level without increase of pain  2) Pt will be able to reach behind back without pain  3) Pt will be able to lift >/= 30 lbs without shoulder pain  4) Pt will be able to sleep on involved side without waking due to pain  5) Pt will be independent with HEP                     PLAN  [x]  Upgrade activities as tolerated     [x]  Continue plan of care  []  Update interventions per flow sheet       []  Discharge due to:_  []  Other:_      Lisa Shepard, PT, DPT 8/6/2018  5:13 PM

## 2018-08-09 ENCOUNTER — HOSPITAL ENCOUNTER (OUTPATIENT)
Dept: PHYSICAL THERAPY | Age: 38
Discharge: HOME | End: 2018-08-09
Payer: COMMERCIAL

## 2018-08-09 PROCEDURE — 97110 THERAPEUTIC EXERCISES: CPT | Performed by: PHYSICAL THERAPIST

## 2018-08-09 PROCEDURE — 97112 NEUROMUSCULAR REEDUCATION: CPT | Performed by: PHYSICAL THERAPIST

## 2018-08-09 PROCEDURE — 97140 MANUAL THERAPY 1/> REGIONS: CPT | Performed by: PHYSICAL THERAPIST

## 2018-08-09 NOTE — PROGRESS NOTES
PT DAILY TREATMENT NOTE - [facility] 2-15    Patient Name: Shahram Burris  Date:2018  : 1980  [x]  Patient  Verified  Payor: BLUE CROSS / Plan: [facility] PPO / Product Type: PPO /    In time:835a  Out time:925a  Total Treatment Time (min): 50  Total Timed Codes (min): 50  1:1 Treatment Time ( only): --   Visit #: 13     Treatment Area: Pain in right shoulder [M25.511]    SUBJECTIVE  Pain Level (0-10 scale): 0/10  Any medication changes, allergies to medications, adverse drug reactions, diagnosis change, or new procedure performed?: [x] No    [] Yes (see summary sheet for update)  Subjective functional status/changes:   [] No changes reported  Pt states that she felt like she could reach across her chest better with less pain.     OBJECTIVE    30 min Therapeutic Exercise:  [x] See flow sheet : shoulder horizontal adduction stretching with and without G-H IR and ER   Rationale: increase ROM, increase strength, improve coordination, improve balance and increase proprioception to improve the patients ability to reaching overhead      10 min  Neuromuscular Reeducation:  [x] See flow sheet : 90-90 plyometric ball toss with tennis ball, standing manual resisted PNF D1 and D2   Rationale: increase ROM, increase strength, improve coordination, improve balance and increase proprioception to improve the patients ability to reaching overhead      10 min Manual Therapy: Cross-friction massage to supraspinatus, infraspinatus and subscapularis tendons,     Rationale: decrease pain, increase ROM and increase tissue extensibility to improve the patients ability to reaching overhead          With   [] TE   [] TA   [] neuro   [] other: Patient Education: [x] Review HEP    [] Progressed/Changed HEP based on:   [] positioning   [] body mechanics   [] transfers   [] heat/ice application    [] other:        Other Objective/Functional Measures: --                   Pain Level (0-10 scale) post treatment: 0/10      ASSESSMENT/Changes in Function:    Pushed end range shoulder horizontal abduction today. No pain with standing PNF D2 and D1 manual resistance in golf stance.    Patient will continue to benefit from skilled PT services to modify and progress therapeutic interventions, address functional mobility deficits, address ROM deficits, address strength deficits, analyze and address soft tissue restrictions, analyze and cue movement patterns, analyze and modify body mechanics/ergonomics and assess and modify postural abnormalities to attain remaining goals.      []  See Plan of Care  []  See progress note/recertification  []  See Discharge Summary      Progress towards goals / Updated goals:  Long Term Goals: To be accomplished in 4-8 weeks:  1) Pt will be able to Reach above shoulder level without increase of pain MET  2) Pt will be able to reach behind back without pain MET  3) Pt will be able to lift >/= 30 lbs without shoulder pain  4) Pt will be able to sleep on involved side without waking due to pain  5) Pt will be independent with HEP                     PLAN  [x]  Upgrade activities as tolerated     [x]  Continue plan of care  []  Update interventions per flow sheet       []  Discharge due to:_  []  Other:_        Nancye Closs, PT, DPT 8/9/2018  10:50 AM

## 2018-08-13 ENCOUNTER — APPOINTMENT (OUTPATIENT)
Dept: PHYSICAL THERAPY | Age: 38
End: 2018-08-13
Payer: COMMERCIAL

## 2018-08-16 ENCOUNTER — HOSPITAL ENCOUNTER (OUTPATIENT)
Dept: PHYSICAL THERAPY | Age: 38
Discharge: HOME | End: 2018-08-16
Payer: COMMERCIAL

## 2018-08-16 PROCEDURE — 97140 MANUAL THERAPY 1/> REGIONS: CPT | Performed by: PHYSICAL THERAPIST

## 2018-08-16 PROCEDURE — 97110 THERAPEUTIC EXERCISES: CPT | Performed by: PHYSICAL THERAPIST

## 2018-08-16 PROCEDURE — 97112 NEUROMUSCULAR REEDUCATION: CPT | Performed by: PHYSICAL THERAPIST

## 2018-08-16 NOTE — PROGRESS NOTES
PT DAILY TREATMENT NOTE - [facility] 2-15    Patient Name: Kolby Castro  Date:2018  : 1980  [x]  Patient  Verified  Payor: BLUE CROSS / Plan: [id number] PPO / Product Type: PPO /    In time:830a  Out time:930a  Total Treatment Time (min): 60  Total Timed Codes (min): 60  1:1 Treatment Time ( only): --   Visit #: 14     Treatment Area: Pain in right shoulder [M25.511]    SUBJECTIVE  Pain Level (0-10 scale): 0/10  Any medication changes, allergies to medications, adverse drug reactions, diagnosis change, or new procedure performed?: [x] No    [] Yes (see summary sheet for update)  Subjective functional status/changes:   [] No changes reported  Pt reports that she played golf this weekend without pain during or after. Only pain now is reaching across her body.     OBJECTIVE     40 min Therapeutic Exercise:  [x] See flow sheet : shoulder horizontal adduction stretching with and without G-H IR and ER   Rationale: increase ROM, increase strength, improve coordination, improve balance and increase proprioception to improve the patients ability to reaching overhead      10 min  Neuromuscular Reeducation:  [x] See flow sheet : 90-90 plyometric ball toss with ball, standing manual resisted PNF D1 and D2   Rationale: increase ROM, increase strength, improve coordination, improve balance and increase proprioception to improve the patients ability to reaching overhead      10 min Manual Therapy: Cross-friction massage to supraspinatus, infraspinatus and subscapularis tendons,     Rationale: decrease pain, increase ROM and increase tissue extensibility to improve the patients ability to reaching overhead          With   [] TE   [] TA   [] neuro   [] other: Patient Education: [x] Review HEP    [] Progressed/Changed HEP based on:   [] positioning   [] body mechanics   [] transfers   [] heat/ice application    [] other:        Other Objective/Functional Measures: --                   Pain Level (0-10 scale) post treatment: 0/10      ASSESSMENT/Changes in Function:    Pushed end range shoulder horizontal abduction.   Will follow up next session and plan for discharge to [facility] will continue to benefit from skilled PT services to modify and progress therapeutic interventions, address functional mobility deficits, address ROM deficits, address strength deficits, analyze and address soft tissue restrictions, analyze and cue movement patterns, analyze and modify body mechanics/ergonomics and assess and modify postural abnormalities to attain remaining goals.      []  See Plan of Care  []  See progress note/recertification  []  See Discharge Summary      Progress towards goals / Updated goals:  Long Term Goals: To be accomplished in 4-8 weeks:  1) Pt will be able to Reach above shoulder level without increase of pain MET  2) Pt will be able to reach behind back without pain MET  3) Pt will be able to lift >/= 30 lbs without shoulder pain  4) Pt will be able to sleep on involved side without waking due to pain  5) Pt will be independent with HEP                     PLAN  [x]  Upgrade activities as tolerated     [x]  Continue plan of care  []  Update interventions per flow sheet       []  Discharge due to:_  []  Other:_      Renée Moore, PT, DPT 8/16/2018  10:28 AM

## 2018-08-27 ENCOUNTER — HOSPITAL ENCOUNTER (OUTPATIENT)
Dept: PHYSICAL THERAPY | Age: 38
Discharge: HOME | End: 2018-08-27
Payer: COMMERCIAL

## 2018-08-27 PROCEDURE — 97110 THERAPEUTIC EXERCISES: CPT | Performed by: PHYSICAL THERAPIST

## 2018-08-27 PROCEDURE — 97112 NEUROMUSCULAR REEDUCATION: CPT | Performed by: PHYSICAL THERAPIST

## 2018-08-27 PROCEDURE — 97140 MANUAL THERAPY 1/> REGIONS: CPT | Performed by: PHYSICAL THERAPIST

## 2018-08-27 NOTE — PROGRESS NOTES
PT DAILY TREATMENT NOTE - [facility] 2-15    Patient Name: Giovanna Parikh  Date:2018  : 1980  [x]  Patient  Verified  Payor: BLUE CROSS / Plan: [employer] [id number] PPO / Product Type: PPO /    In time:400p  Out time:450p  Total Treatment Time (min): 50  Total Timed Codes (min): 50  1:1 Treatment Time ( only): --   Visit #: 15     Treatment Area: Pain in right shoulder [M25.511]    SUBJECTIVE  Pain Level (0-10 scale): 0/10  Any medication changes, allergies to medications, adverse drug reactions, diagnosis change, or new procedure performed?: [x] No    [] Yes (see summary sheet for update)  Subjective functional status/changes:   [] No changes reported  Pt states that she has much more movement but does get occasional posterior shoulder pain with reaching across her chest into horizontal adduction.     OBJECTIVE  30 min Therapeutic Exercise:  [x] See flow sheet : shoulder horizontal adduction stretching with and without G-H IR and ER   Rationale: increase ROM, increase strength, improve coordination, improve balance and increase proprioception to improve the patients ability to reaching overhead      10 min  Neuromuscular Reeducation:  [x] See flow sheet : 90-90 plyometric ball toss with ball, standing manual resisted PNF D1 and D2   Rationale: increase ROM, increase strength, improve coordination, improve balance and increase proprioception to improve the patients ability to reaching overhead      10 min Manual Therapy: Cross-friction massage to supraspinatus, infraspinatus and subscapularis tendons,     Rationale: decrease pain, increase ROM and increase tissue extensibility to improve the patients ability to reaching overhead          With   [] TE   [] TA   [] neuro   [] other: Patient Education: [x] Review HEP    [] Progressed/Changed HEP based on:   [] positioning   [] body mechanics   [] transfers   [] heat/ice application    [] other:        Other Objective/Functional Measures: --                   Pain Level (0-10 scale) post treatment: 0/10      ASSESSMENT/Changes in Function:     []  See Plan of Care  []  See progress note/recertification  [x]  See Discharge Summary      Progress towards goals / Updated goals:  Long Term Goals: To be accomplished in 4-8 weeks:  1) Pt will be able to Reach above shoulder level without increase of pain MET  2) Pt will be able to reach behind back without pain MET  3) Pt will be able to lift >/= 30 lbs without shoulder pain MET  4) Pt will be able to sleep on involved side without waking due to pain MET  5) Pt will be independent with HEP MET                    PLAN  [x]  Upgrade activities as tolerated     [x]  Continue plan of care  []  Update interventions per flow sheet       []  Discharge due to:_  []  Other:_      Caron Coats, PT, DPT 8/27/2018  4:19 PM

## 2018-08-27 NOTE — ANCILLARY DISCHARGE INSTRUCTIONS
[facility]  [address] ([place] [place]), [address] [facility],  [address]  Phone: [phone] Fax: [phone]    Discharge Summary  2-15    Patient name: Brandt Donato  : 1980  Provider#: [id number]  Referral source: Referred, Self, MD      Medical/Treatment Diagnosis: Pain in right shoulder [M25.511]     Prior Hospitalization: see medical history     Comorbidities: none  Prior Level of Function: complete 20 minutes of exercise at least 3 times a week  Medications: Verified on Patient Summary List     Start of Care: 2018                                                            Onset Date: 2018    Visits from Start of Care: 15     Missed Visits: 0  Reporting Period : 2018 to 2018      ASSESSMENT/SUMMARY OF CARE:  Gayla Pnieda states that she has much more movement but does get occasional posterior shoulder pain with reaching across her chest into horizontal adduction. She has been instructed in a specific shoulder stretching and strengthening program to conintu on her own at this time.     Progress towards goals / Updated goals:  Long Term Goals: To be accomplished in 4-8 weeks:  1) Pt will be able to Reach above shoulder level without increase of pain MET  2) Pt will be able to reach behind back without pain MET  3) Pt will be able to lift >/= 30 lbs without shoulder pain MET  4) Pt will be able to sleep on involved side without waking due to pain MET  5) Pt will be independent with HEP MET          RECOMMENDATIONS:  [x]Discontinue therapy: [x]Patient has reached or is progressing toward set goals      []Patient is non-compliant or has abdicated      []Due to lack of appreciable progress towards set goals      []Other    Jose M Sepulveda PT, DPT 2018 4:59 PM

## 2018-10-04 LAB
ANTIBODY SCREEN, EXTERNAL: NEGATIVE
CHLAMYDIA, EXTERNAL: NEGATIVE
HBSAG, EXTERNAL: NEGATIVE
HIV, EXTERNAL: NON REACTIVE
N. GONORRHEA, EXTERNAL: NEGATIVE
RUBELLA, EXTERNAL: NORMAL
T. PALLIDUM, EXTERNAL: NEGATIVE
TYPE, ABO & RH, EXTERNAL: NORMAL

## 2018-10-10 RX ORDER — LEVOTHYROXINE SODIUM 112 UG/1
TABLET ORAL
Qty: 90 TAB | Refills: 3
Start: 2018-10-10 | End: 2018-11-06

## 2018-11-06 RX ORDER — LEVOTHYROXINE SODIUM 112 UG/1
TABLET ORAL
Qty: 90 TAB | Refills: 3
Start: 2018-11-06 | End: 2019-01-23

## 2019-01-18 LAB
T4 FREE SERPL-MCNC: 1.27 NG/DL (ref 0.82–1.77)
TSH SERPL DL<=0.005 MIU/L-ACNC: 0.18 UIU/ML (ref 0.45–4.5)

## 2019-01-23 ENCOUNTER — OFFICE VISIT (OUTPATIENT)
Dept: ENDOCRINOLOGY | Age: 39
End: 2019-01-23

## 2019-01-23 VITALS
HEIGHT: 63 IN | HEART RATE: 76 BPM | SYSTOLIC BLOOD PRESSURE: 103 MMHG | WEIGHT: 189.2 LBS | DIASTOLIC BLOOD PRESSURE: 70 MMHG | BODY MASS INDEX: 33.52 KG/M2

## 2019-01-23 DIAGNOSIS — E66.9 OBESITY (BMI 30.0-34.9): ICD-10-CM

## 2019-01-23 DIAGNOSIS — E03.9 ACQUIRED HYPOTHYROIDISM: Primary | ICD-10-CM

## 2019-01-23 RX ORDER — LEVOTHYROXINE SODIUM 112 UG/1
TABLET ORAL
Qty: 90 TAB | Refills: 3 | Status: SHIPPED | OUTPATIENT
Start: 2019-01-23 | End: 2019-06-26

## 2019-01-23 RX ORDER — GUAIFENESIN 100 MG/5ML
81 LIQUID (ML) ORAL DAILY
COMMUNITY
End: 2019-05-05

## 2019-01-23 NOTE — PROGRESS NOTES
Chief Complaint   Patient presents with    Thyroid Problem     pcp and pharmacy confirmed     History of Present Illness: Elham Clinton is a 41 y.o. female here for follow up of thyroid. Weight up 21 lbs since last visit in 7/18. Currently 26 weeks pregnant and did not find out the sex. Has remained on Levoxyl 7.5 tabs/week at bedtime since end of October. Pregnancy has been rather uneventful. Still taking the pnv in the morning. Will be having her glucola on 2/5/19 and will have her next set of thyroid labs drawn in March. Does have some fatigue but still trying to work out in the morning and do her normal work responsibilities. Having more constipation. No hair loss or brittle nails. Some dry skin typical for the winter. No heat or cold intolerance. Due date is 4/30/19. Current Outpatient Medications   Medication Sig    OTHER,NON-FORMULARY, 100 mg two (2) times a day. Stool softner    aspirin 81 mg chewable tablet Take 81 mg by mouth daily.  LEVOXYL 112 mcg tablet Take 1 tab on Sun-Fri and 1.5 tabs on Sat. BRAND MEDICALLY NECESSARY--Dose change 11/6/18--updated med list--did not send prescription to the pharmacy    PNV ZK.68/GTIAWIK FUM/FOLIC AC (PRENATAL PO) Take  by mouth daily. No current facility-administered medications for this visit. Allergies   Allergen Reactions    Erythromycin Rash     Review of Systems:  - Cardiovascular: no chest pain or palpitations  - Neurological: no tremors  - Integumentary: skin is normal    Physical Examination:  Blood pressure 103/70, pulse 76, height 5' 3\" (1.6 m), weight 189 lb 3.2 oz (85.8 kg).   - General: pleasant, no distress, good eye contact   - Neck: small goiter  - Cardiovascular: regular, normal rate, nl s1 and s2, no m/r/g   - Respiratory: clear to auscultation bilaterally   - Integumentary: skin is normal, no edema  - Neurological: reflexes 2+ at biceps, no tremors  - Psychiatric: normal mood and affect    Data Reviewed: Component      Latest Ref Rng & Units 1/17/2019 1/17/2019           9:10 AM  9:10 AM   T4, Free      0.82 - 1.77 ng/dL  1.27   TSH      0.450 - 4.500 uIU/mL 0.182 (L)        Assessment/Plan:     1. Acquired hypothyroidism: When she was 16, recalls having a lot of fatigue and was checked and found to have hypothyroidism and has been on medication ever since. Had been on levothyroxine 50 mcg daily for years and may have been on brand levoxyl as a teenager but not recently. Was taking this at the same time as her calcium and her OCP and also drinks coffee within 30 minutes. TSH was 2.59 in 7/15 and TPO ab was normal.  Increased her dose to 75 mcg daily and advised her to move the thyroid med away from the calcium and coffee to improve absorption and she has been taking at bedtime. TSH down to 1.36 in 1/16 and changed to Levoxyl and TSH 1.57 in 7/16. Down to 1.49 in 11/16 so increased dose to 88 mcg daily to try and get TSH < 1 to help with conception and became pregnant in 4/17 and I increased her dose to 1 tab on Mon-Fri and 2 tabs on Sat-Sun and TSH 1.08 in 5/17 so had her stay on this dose but then miscarried at the end of 5/17. Got pregnant again in 9/17 and miscarried in 10/17. TSH down to 0.46 in 1/18 so her current dose is still correct and not the reason for her miscarriages. I changed to 112 mcg tabs to take 1 tab daily to get a consistent dose in her system and allow for titration on the weekends when she becomes pregnant again and TSH down to 0.317 in 7/18 with losing 15 lbs so had her decrease to 6.5 tab/week. Became pregnant in 8/18 and increased her dose to 8.5 tabs/week and TSH 0.03 in 10/18 and decreased her dose to 8 tabs/week and later that month TSH up to 0.12 so decreased to 7.5 tabs/week and TSH up to 0.29 in 12/18 so kept dose the same. TSH down to 0.18 in 1/19 so decreased to 7 tabs/week and will ask that her labs be repeated in March at Dr. Olive Gutiérrez' office and faxed to my attention.   - check TSH and free T4 in March and prior to next visit  - decrease levoxyl to 112 mcg 1 tab daily at bedtime        2. Obesity (BMI 30.0-34. 9): Had lost 12 lbs with starting phentermine in 7/16 but gained some back over Thanksgiving. Has stopped since being pregnant in 4/17 and then miscarried. Will stay off while still trying to conceive. Wt down 15 lbs from 1/18 to 7/18.  Up 21 lbs by 1/19 but is pregnant. - cont diet and exercise        Patient Instructions   1) TSH is a thyroid test.  Your level is 0.18 which is still slightly low and below goal of 0.5-2.0. This test goes opposite of your thyroid dose and suggests your dose of levoxyl is more than you need. I will decrease your dose to 1 tab 7 days a week. I sent this to the pharmacy today. 2) Please ensure I get a copy of your glucola screen next month and if this is abnormal, I'm happy to see you back during pregnancy to manage gestational diabetes. 3) Plan on having your OB repeat your thyroid levels in March and send me the results and based on this result, we'll decide what to take for the rest of pregnancy and what to do after you deliver. Follow-up Disposition:  Return in about 5 months (around 6/23/2019). Copy sent to:  Dr. Stefani Deleon    Lab follow up: 3/11/19  - TSH 0.371  - FT4 1.05  - T3 111    Sent her the following message through [employer]:  I received your labs from Dr. Nolberto Deleon. TSH is a thyroid test.  Your level is 0.37 which is slightly low and below goal of 0.5-2.0. This test goes opposite of your thyroid dose and suggests your dose of levoxyl is slightly more than you need but I will keep your dose the same to ensure that you and the baby have adequate thyroid hormone the rest of the pregnancy. Stay on this dose until you deliver and after delivery, plan on taking 1 tab 6 days a week and none once a week on a day of your choice until you come back to see me.   Let me know if you need anything else during or after pregnancy.

## 2019-01-23 NOTE — PATIENT INSTRUCTIONS
1) TSH is a thyroid test.  Your level is 0.18 which is still slightly low and below goal of 0.5-2.0. This test goes opposite of your thyroid dose and suggests your dose of levoxyl is more than you need. I will decrease your dose to 1 tab 7 days a week. I sent this to the pharmacy today. 2) Please ensure I get a copy of your glucola screen next month and if this is abnormal, I'm happy to see you back during pregnancy to manage gestational diabetes. 3) Plan on having your OB repeat your thyroid levels in March and send me the results and based on this result, we'll decide what to take for the rest of pregnancy and what to do after you deliver.

## 2019-02-06 ENCOUNTER — PATIENT MESSAGE (OUTPATIENT)
Dept: ENDOCRINOLOGY | Age: 39
End: 2019-02-06

## 2019-04-05 LAB — GRBS, EXTERNAL: NEGATIVE

## 2019-04-30 ENCOUNTER — ANESTHESIA EVENT (OUTPATIENT)
Dept: LABOR AND DELIVERY | Age: 39
End: 2019-04-30
Payer: COMMERCIAL

## 2019-05-02 ENCOUNTER — HOSPITAL ENCOUNTER (OUTPATIENT)
Dept: OTHER | Age: 39
Discharge: HOME | End: 2019-05-02
Payer: COMMERCIAL

## 2019-05-02 VITALS — BODY MASS INDEX: 35.51 KG/M2 | WEIGHT: 208 LBS | HEIGHT: 64 IN

## 2019-05-02 LAB
BASOPHILS # BLD: 0 K/UL (ref 0–0.1)
BASOPHILS NFR BLD: 0 % (ref 0–1)
DIFFERENTIAL METHOD BLD: ABNORMAL
EOSINOPHIL # BLD: 0.1 K/UL (ref 0–0.4)
EOSINOPHIL NFR BLD: 1 % (ref 0–7)
ERYTHROCYTE [DISTWIDTH] IN BLOOD BY AUTOMATED COUNT: 12.6 % (ref 11.5–14.5)
HCT VFR BLD AUTO: 38.8 % (ref 35–47)
HGB BLD-MCNC: 12.7 G/DL (ref 11.5–16)
IMM GRANULOCYTES # BLD AUTO: 0.1 K/UL (ref 0–0.04)
IMM GRANULOCYTES NFR BLD AUTO: 1 % (ref 0–0.5)
LYMPHOCYTES # BLD: 1.7 K/UL (ref 0.8–3.5)
LYMPHOCYTES NFR BLD: 15 % (ref 12–49)
MCH RBC QN AUTO: 31.3 PG (ref 26–34)
MCHC RBC AUTO-ENTMCNC: 32.7 G/DL (ref 30–36.5)
MCV RBC AUTO: 95.6 FL (ref 80–99)
MONOCYTES # BLD: 0.7 K/UL (ref 0–1)
MONOCYTES NFR BLD: 6 % (ref 5–13)
NEUTS SEG # BLD: 8.6 K/UL (ref 1.8–8)
NEUTS SEG NFR BLD: 77 % (ref 32–75)
NRBC # BLD: 0 K/UL (ref 0–0.01)
NRBC BLD-RTO: 0 PER 100 WBC
PLATELET # BLD AUTO: 205 K/UL (ref 150–400)
PMV BLD AUTO: 11.5 FL (ref 8.9–12.9)
RBC # BLD AUTO: 4.06 M/UL (ref 3.8–5.2)
WBC # BLD AUTO: 11.3 K/UL (ref 3.6–11)

## 2019-05-02 PROCEDURE — 86644 CMV ANTIBODY: CPT

## 2019-05-02 PROCEDURE — 36415 COLL VENOUS BLD VENIPUNCTURE: CPT

## 2019-05-02 PROCEDURE — 85025 COMPLETE CBC W/AUTO DIFF WBC: CPT

## 2019-05-02 PROCEDURE — 86900 BLOOD TYPING SEROLOGIC ABO: CPT

## 2019-05-02 PROCEDURE — 86870 RBC ANTIBODY IDENTIFICATION: CPT

## 2019-05-02 PROCEDURE — 86922 COMPATIBILITY TEST ANTIGLOB: CPT

## 2019-05-02 PROCEDURE — 86921 COMPATIBILITY TEST INCUBATE: CPT

## 2019-05-02 PROCEDURE — 86920 COMPATIBILITY TEST SPIN: CPT

## 2019-05-02 NOTE — PROGRESS NOTES
Patient here for Pre-Admission Testing (PAT) for scheduled  section. PAT packet reviewed with the patient. Labs drawn and sent. WILL wipes and preventing surgical site infection education given to the patient. Education also provided to be NPO after 0200 and to arrive for scheduled procedure at 1000 on 5/3. Patient verbalizes understanding and sent home with PAT packet for further review. Patient instructed to take no medication(s) on the morning of her scheduled procedure.

## 2019-05-03 ENCOUNTER — ANESTHESIA (OUTPATIENT)
Dept: LABOR AND DELIVERY | Age: 39
End: 2019-05-03
Payer: COMMERCIAL

## 2019-05-03 ENCOUNTER — HOSPITAL ENCOUNTER (INPATIENT)
Age: 39
LOS: 2 days | Discharge: HOME | End: 2019-05-05
Attending: OBSTETRICS & GYNECOLOGY | Admitting: OBSTETRICS & GYNECOLOGY
Payer: COMMERCIAL

## 2019-05-03 PROCEDURE — 77030014125 HC TY EPDRL BBMI -B: Performed by: ANESTHESIOLOGY

## 2019-05-03 PROCEDURE — 36415 COLL VENOUS BLD VENIPUNCTURE: CPT

## 2019-05-03 PROCEDURE — 74011250636 HC RX REV CODE- 250/636

## 2019-05-03 PROCEDURE — 77030034849

## 2019-05-03 PROCEDURE — 74011000250 HC RX REV CODE- 250

## 2019-05-03 PROCEDURE — 76060000078 HC EPIDURAL ANESTHESIA: Performed by: OBSTETRICS & GYNECOLOGY

## 2019-05-03 PROCEDURE — 74011250636 HC RX REV CODE- 250/636: Performed by: OBSTETRICS & GYNECOLOGY

## 2019-05-03 PROCEDURE — 65410000002 HC RM PRIVATE OB

## 2019-05-03 PROCEDURE — 76010000392 HC C SECN EA ADDL 0.5 HR: Performed by: OBSTETRICS & GYNECOLOGY

## 2019-05-03 PROCEDURE — 76010000391 HC C SECN FIRST 1 HR: Performed by: OBSTETRICS & GYNECOLOGY

## 2019-05-03 PROCEDURE — 75410000003 HC RECOV DEL/VAG/CSECN EA 0.5 HR: Performed by: OBSTETRICS & GYNECOLOGY

## 2019-05-03 PROCEDURE — 74011250636 HC RX REV CODE- 250/636: Performed by: ANESTHESIOLOGY

## 2019-05-03 PROCEDURE — 77030012890

## 2019-05-03 PROCEDURE — 4A0HXCZ MEASUREMENT OF PRODUCTS OF CONCEPTION, CARDIAC RATE, EXTERNAL APPROACH: ICD-10-PCS | Performed by: OBSTETRICS & GYNECOLOGY

## 2019-05-03 RX ORDER — ONDANSETRON 2 MG/ML
4 INJECTION INTRAMUSCULAR; INTRAVENOUS
Status: DISCONTINUED | OUTPATIENT
Start: 2019-05-03 | End: 2019-05-05 | Stop reason: HOSPADM

## 2019-05-03 RX ORDER — HYDROCORTISONE 1 %
CREAM (GRAM) TOPICAL AS NEEDED
Status: DISCONTINUED | OUTPATIENT
Start: 2019-05-03 | End: 2019-05-05 | Stop reason: HOSPADM

## 2019-05-03 RX ORDER — CEFAZOLIN SODIUM/WATER 2 G/20 ML
2 SYRINGE (ML) INTRAVENOUS ONCE
Status: DISCONTINUED | OUTPATIENT
Start: 2019-05-03 | End: 2019-05-03 | Stop reason: HOSPADM

## 2019-05-03 RX ORDER — MORPHINE SULFATE 10 MG/ML
10 INJECTION, SOLUTION INTRAMUSCULAR; INTRAVENOUS
Status: DISCONTINUED | OUTPATIENT
Start: 2019-05-03 | End: 2019-05-05 | Stop reason: HOSPADM

## 2019-05-03 RX ORDER — NALBUPHINE HYDROCHLORIDE 10 MG/ML
2.5 INJECTION, SOLUTION INTRAMUSCULAR; INTRAVENOUS; SUBCUTANEOUS
Status: DISCONTINUED | OUTPATIENT
Start: 2019-05-03 | End: 2019-05-05 | Stop reason: HOSPADM

## 2019-05-03 RX ORDER — PHENYLEPHRINE 10 MG/250 ML(40 MCG/ML)IN 0.9 % SOD.CHLORIDE INTRAVENOUS
Status: DISPENSED
Start: 2019-05-03 | End: 2019-05-03

## 2019-05-03 RX ORDER — KETOROLAC TROMETHAMINE 30 MG/ML
30 INJECTION, SOLUTION INTRAMUSCULAR; INTRAVENOUS
Status: DISPENSED | OUTPATIENT
Start: 2019-05-03 | End: 2019-05-04

## 2019-05-03 RX ORDER — ONDANSETRON 2 MG/ML
INJECTION INTRAMUSCULAR; INTRAVENOUS AS NEEDED
Status: DISCONTINUED | OUTPATIENT
Start: 2019-05-03 | End: 2019-05-03 | Stop reason: HOSPADM

## 2019-05-03 RX ORDER — SODIUM CHLORIDE 0.9 % (FLUSH) 0.9 %
5-40 SYRINGE (ML) INJECTION EVERY 8 HOURS
Status: DISCONTINUED | OUTPATIENT
Start: 2019-05-03 | End: 2019-05-05 | Stop reason: HOSPADM

## 2019-05-03 RX ORDER — BUPIVACAINE HYDROCHLORIDE 7.5 MG/ML
INJECTION, SOLUTION INTRASPINAL
Status: COMPLETED | OUTPATIENT
Start: 2019-05-03 | End: 2019-05-03

## 2019-05-03 RX ORDER — AMMONIA 15 % (W/V)
1 AMPUL (EA) INHALATION AS NEEDED
Status: DISCONTINUED | OUTPATIENT
Start: 2019-05-03 | End: 2019-05-05 | Stop reason: HOSPADM

## 2019-05-03 RX ORDER — NALOXONE HYDROCHLORIDE 0.4 MG/ML
0.4 INJECTION, SOLUTION INTRAMUSCULAR; INTRAVENOUS; SUBCUTANEOUS AS NEEDED
Status: DISCONTINUED | OUTPATIENT
Start: 2019-05-03 | End: 2019-05-05 | Stop reason: HOSPADM

## 2019-05-03 RX ORDER — OXYTOCIN/RINGER'S LACTATE 20/1000 ML
125 PLASTIC BAG, INJECTION (ML) INTRAVENOUS AS NEEDED
Status: DISCONTINUED | OUTPATIENT
Start: 2019-05-03 | End: 2019-05-05 | Stop reason: HOSPADM

## 2019-05-03 RX ORDER — EPHEDRINE SULFATE 50 MG/ML
INJECTION, SOLUTION INTRAVENOUS AS NEEDED
Status: DISCONTINUED | OUTPATIENT
Start: 2019-05-03 | End: 2019-05-03 | Stop reason: HOSPADM

## 2019-05-03 RX ORDER — ACETAMINOPHEN 325 MG/1
650 TABLET ORAL
Status: DISCONTINUED | OUTPATIENT
Start: 2019-05-03 | End: 2019-05-05 | Stop reason: HOSPADM

## 2019-05-03 RX ORDER — CEFAZOLIN SODIUM/WATER 2 G/20 ML
2 SYRINGE (ML) INTRAVENOUS ONCE
Status: COMPLETED | OUTPATIENT
Start: 2019-05-03 | End: 2019-05-03

## 2019-05-03 RX ORDER — OXYTOCIN/RINGER'S LACTATE 20/1000 ML
125-1000 PLASTIC BAG, INJECTION (ML) INTRAVENOUS
Status: DISCONTINUED | OUTPATIENT
Start: 2019-05-03 | End: 2019-05-05 | Stop reason: HOSPADM

## 2019-05-03 RX ORDER — LEVOTHYROXINE SODIUM 112 UG/1
112 TABLET ORAL
Status: DISCONTINUED | OUTPATIENT
Start: 2019-05-04 | End: 2019-05-05 | Stop reason: HOSPADM

## 2019-05-03 RX ORDER — SODIUM CHLORIDE, SODIUM LACTATE, POTASSIUM CHLORIDE, CALCIUM CHLORIDE 600; 310; 30; 20 MG/100ML; MG/100ML; MG/100ML; MG/100ML
125 INJECTION, SOLUTION INTRAVENOUS CONTINUOUS
Status: DISCONTINUED | OUTPATIENT
Start: 2019-05-03 | End: 2019-05-05 | Stop reason: HOSPADM

## 2019-05-03 RX ORDER — ONDANSETRON 2 MG/ML
4 INJECTION INTRAMUSCULAR; INTRAVENOUS
Status: DISCONTINUED | OUTPATIENT
Start: 2019-05-03 | End: 2019-05-03 | Stop reason: SDUPTHER

## 2019-05-03 RX ORDER — IBUPROFEN 400 MG/1
800 TABLET ORAL EVERY 8 HOURS
Status: DISCONTINUED | OUTPATIENT
Start: 2019-05-03 | End: 2019-05-05 | Stop reason: HOSPADM

## 2019-05-03 RX ORDER — DOCUSATE SODIUM 100 MG/1
100 CAPSULE, LIQUID FILLED ORAL
Status: DISCONTINUED | OUTPATIENT
Start: 2019-05-03 | End: 2019-05-05 | Stop reason: HOSPADM

## 2019-05-03 RX ORDER — MORPHINE SULFATE 0.5 MG/ML
INJECTION, SOLUTION EPIDURAL; INTRATHECAL; INTRAVENOUS
Status: COMPLETED | OUTPATIENT
Start: 2019-05-03 | End: 2019-05-03

## 2019-05-03 RX ORDER — SODIUM CHLORIDE 0.9 % (FLUSH) 0.9 %
5-40 SYRINGE (ML) INJECTION AS NEEDED
Status: DISCONTINUED | OUTPATIENT
Start: 2019-05-03 | End: 2019-05-05 | Stop reason: HOSPADM

## 2019-05-03 RX ORDER — SIMETHICONE 80 MG
80 TABLET,CHEWABLE ORAL
Status: DISCONTINUED | OUTPATIENT
Start: 2019-05-03 | End: 2019-05-05 | Stop reason: HOSPADM

## 2019-05-03 RX ORDER — KETOROLAC TROMETHAMINE 30 MG/ML
INJECTION, SOLUTION INTRAMUSCULAR; INTRAVENOUS AS NEEDED
Status: DISCONTINUED | OUTPATIENT
Start: 2019-05-03 | End: 2019-05-03 | Stop reason: HOSPADM

## 2019-05-03 RX ORDER — DIPHENHYDRAMINE HCL 25 MG
25 CAPSULE ORAL
Status: DISCONTINUED | OUTPATIENT
Start: 2019-05-03 | End: 2019-05-05 | Stop reason: HOSPADM

## 2019-05-03 RX ORDER — OXYTOCIN/RINGER'S LACTATE 20/1000 ML
125-1000 PLASTIC BAG, INJECTION (ML) INTRAVENOUS
Status: DISCONTINUED | OUTPATIENT
Start: 2019-05-03 | End: 2019-05-03 | Stop reason: HOSPADM

## 2019-05-03 RX ORDER — OXYTOCIN/RINGER'S LACTATE 20/1000 ML
PLASTIC BAG, INJECTION (ML) INTRAVENOUS
Status: DISCONTINUED | OUTPATIENT
Start: 2019-05-03 | End: 2019-05-03 | Stop reason: HOSPADM

## 2019-05-03 RX ORDER — OXYCODONE AND ACETAMINOPHEN 5; 325 MG/1; MG/1
1 TABLET ORAL
Status: DISCONTINUED | OUTPATIENT
Start: 2019-05-03 | End: 2019-05-05 | Stop reason: HOSPADM

## 2019-05-03 RX ORDER — SODIUM CHLORIDE 0.9 % (FLUSH) 0.9 %
5-40 SYRINGE (ML) INJECTION EVERY 8 HOURS
Status: DISCONTINUED | OUTPATIENT
Start: 2019-05-03 | End: 2019-05-03 | Stop reason: HOSPADM

## 2019-05-03 RX ORDER — MORPHINE SULFATE 10 MG/ML
6 INJECTION, SOLUTION INTRAMUSCULAR; INTRAVENOUS
Status: DISCONTINUED | OUTPATIENT
Start: 2019-05-03 | End: 2019-05-05 | Stop reason: HOSPADM

## 2019-05-03 RX ORDER — OXYTOCIN/RINGER'S LACTATE 20/1000 ML
PLASTIC BAG, INJECTION (ML) INTRAVENOUS
Status: DISPENSED
Start: 2019-05-03 | End: 2019-05-04

## 2019-05-03 RX ORDER — SODIUM CHLORIDE 0.9 % (FLUSH) 0.9 %
5-40 SYRINGE (ML) INJECTION AS NEEDED
Status: DISCONTINUED | OUTPATIENT
Start: 2019-05-03 | End: 2019-05-03 | Stop reason: HOSPADM

## 2019-05-03 RX ORDER — OXYTOCIN/RINGER'S LACTATE 20/1000 ML
999 PLASTIC BAG, INJECTION (ML) INTRAVENOUS AS NEEDED
Status: DISCONTINUED | OUTPATIENT
Start: 2019-05-03 | End: 2019-05-05 | Stop reason: HOSPADM

## 2019-05-03 RX ADMIN — ONDANSETRON 4 MG: 2 INJECTION INTRAMUSCULAR; INTRAVENOUS at 11:50

## 2019-05-03 RX ADMIN — EPHEDRINE SULFATE 10 MG: 50 INJECTION, SOLUTION INTRAVENOUS at 11:22

## 2019-05-03 RX ADMIN — Medication 10 ML: at 16:37

## 2019-05-03 RX ADMIN — SODIUM CHLORIDE, SODIUM LACTATE, POTASSIUM CHLORIDE, AND CALCIUM CHLORIDE 1000 ML: 600; 310; 30; 20 INJECTION, SOLUTION INTRAVENOUS at 10:36

## 2019-05-03 RX ADMIN — BUPIVACAINE HYDROCHLORIDE 12 MG: 7.5 INJECTION, SOLUTION INTRASPINAL at 11:14

## 2019-05-03 RX ADMIN — NALBUPHINE HYDROCHLORIDE 2.5 MG: 10 INJECTION, SOLUTION INTRAMUSCULAR; INTRAVENOUS; SUBCUTANEOUS at 16:37

## 2019-05-03 RX ADMIN — KETOROLAC TROMETHAMINE 30 MG: 30 INJECTION, SOLUTION INTRAMUSCULAR; INTRAVENOUS at 12:10

## 2019-05-03 RX ADMIN — SODIUM CHLORIDE, POTASSIUM CHLORIDE, SODIUM LACTATE AND CALCIUM CHLORIDE: 600; 310; 30; 20 INJECTION, SOLUTION INTRAVENOUS at 10:50

## 2019-05-03 RX ADMIN — ONDANSETRON 4 MG: 2 INJECTION INTRAMUSCULAR; INTRAVENOUS at 14:11

## 2019-05-03 RX ADMIN — Medication 2 G: at 10:55

## 2019-05-03 RX ADMIN — Medication 999 ML/HR: at 11:35

## 2019-05-03 RX ADMIN — MORPHINE SULFATE 0.2 MG: 0.5 INJECTION, SOLUTION EPIDURAL; INTRATHECAL; INTRAVENOUS at 11:14

## 2019-05-03 NOTE — ANESTHESIA PREPROCEDURE EVALUATION
Relevant Problems No relevant active problems Anesthetic History No history of anesthetic complications Review of Systems / Medical History Patient summary reviewed, nursing notes reviewed and pertinent labs reviewed Pulmonary Within defined limits Neuro/Psych Within defined limits Cardiovascular Within defined limits Exercise tolerance: >4 METS 
  
GI/Hepatic/Renal 
Within defined limits Endo/Other Hypothyroidism Obesity Other Findings Comments: breech Physical Exam 
 
Airway Mallampati: II 
TM Distance: 4 - 6 cm Neck ROM: normal range of motion Mouth opening: Normal 
 
 Cardiovascular Regular rate and rhythm,  S1 and S2 normal,  no murmur, click, rub, or gallop Dental 
No notable dental hx Pulmonary Breath sounds clear to auscultation Abdominal 
GI exam deferred Other Findings Anesthetic Plan ASA: 2 Anesthesia type: spinal 
 
 
Post-op pain plan if not by surgeon: intrathecal opiates Anesthetic plan and risks discussed with: Patient

## 2019-05-03 NOTE — ANESTHESIA PROCEDURE NOTES
Spinal Block    Start time: 5/3/2019 11:10 AM  End time: 5/3/2019 11:14 AM  Performed by: Laney Obrien MD  Authorized by: Laney Obrien MD     Pre-procedure:   Indications: primary anesthetic  Preanesthetic Checklist: patient identified, risks and benefits discussed, site marked, patient being monitored and timeout performed      Spinal Block:   Patient Position:  Seated  Prep Region:  Lumbar  Prep: Betadine      Location:  L2-3  Technique:  Single shot        Needle:   Needle Type:  Pencan  Needle Gauge:  24 G  Attempts:  1      Events: CSF confirmed, no blood with aspiration and no paresthesia        Assessment:  Insertion:  Uncomplicated  Patient tolerance:  Patient tolerated the procedure well with no immediate complications

## 2019-05-03 NOTE — ANESTHESIA POSTPROCEDURE EVALUATION
Post-Anesthesia Evaluation and Assessment Patient: Kolby Castro MRN: [id number]  SSN: [id number] YOB: 1980  Age: 46 y.o. Sex: female I have evaluated the patient and they are stable and ready for discharge from the PACU. Cardiovascular Function/Vital Signs Visit Vitals /60 Pulse (!) 51 Temp 36.8 °C (98.2 °F) Resp 16 Ht 5' 4\" (1.626 m) Wt 94.3 kg (208 lb) SpO2 98% Breastfeeding? No  
BMI 35.70 kg/m² Patient is status post Spinal anesthesia for Procedure(s) with comments:  SECTION - EDC 19. Nausea/Vomiting: None Postoperative hydration reviewed and adequate. Pain: 
Pain Scale 1: Numeric (0 - 10) (19 1312) Pain Intensity 1: 0 (19 1312) Managed Neurological Status:  
Neuro (WDL): Within Defined Limits (19 1224) Block resolving Mental Status, Level of Consciousness: Alert and  oriented to person, place, and time Pulmonary Status:  
O2 Device: Room air (19 1224) Adequate oxygenation and airway patent Complications related to anesthesia: None Post-anesthesia assessment completed. No concerns Signed By: Sarah Couch MD   
 May 3, 2019 Procedure(s):  SECTION. spinal 
 
<BSHSIANPOST> Vitals Value Taken Time /60 5/3/2019  1:12 PM  
Temp 36.8 °C (98.2 °F) 5/3/2019 12:24 PM  
Pulse 51 5/3/2019  1:12 PM  
Resp 16 5/3/2019  1:12 PM  
SpO2 95 % 5/3/2019  1:25 PM  
Vitals shown include unvalidated device data.

## 2019-05-03 NOTE — LACTATION NOTE
This note was copied from a baby's chart. I went in to see mom. Dad said she was not feeling well. Dad said he would call out when breast feeding assistance was needed this evening.  I explained that someone from lactation would be seeing mom each day that she was in the hospital.

## 2019-05-03 NOTE — LACTATION NOTE
1 Discussed with mother her plan for feeding her baby. Reviewed the benefits and management of exclusive breast milk feeding as well as the importance of latching within the first hour after delivery. Instruction provided on how to recognize hunger cues and initiate breast feeding in response to those cues. Patient confirms breast milk feed exclusively as intended feeding method at this time.

## 2019-05-03 NOTE — H&P
Labor and Delivery Admission Note CC: Scheduled c/s 
 
5/3/2019 
 
46 y.o., , female, G3 P 0020 @ 40 3/7 wks with Estimated Date of Delivery: 19 by dates and US presents at 1011 for scheduled  due to persistent breech presentation. Reports good fetal movement, no bleeding, and no LOF. This pregnancy has been supervised by Dr. Mega Espinoza and is notable for resolved marginal placenta previa, borderline AARTI resolved, and persistent breech presentation. PNC: Blood type: O 
          RH: neg (rhogam 19) Ab screen: negative HBsAg: negative DM Screen: 80 TPA: negative HIV: non reactive GC/Chlamydia: negative Rubella: immune SVII serology: no history given GBS status: negative Past Medical History:  
Diagnosis Date  Unspecified hypothyroidism Taking Levoxyl Past Surgical History:  
Procedure Laterality Date  HX WISDOM TEETH EXTRACTION    
 
OB/GYN: 2 SABs Meds:  
Current Facility-Administered Medications Medication Dose Route Frequency  lactated ringers bolus infusion 1,000 mL  1,000 mL IntraVENous ONCE  
 lactated ringers bolus infusion 300-1,000 mL  300-1,000 mL IntraVENous PRN  
 oxytocin (PITOCIN) 20 units/1000 ml LR  125-1,000 mL/hr IntraVENous TITRATE  phenylephrine (KAUSHIK-SYNEPHRINE) 10 mg/250 mL (40 mcg/mL) infusion Allergies: Allergies Allergen Reactions  Erythromycin Rash Pertinent ROS: as per HPI o/w negative Family History Problem Relation Age of Onset  Thyroid Disease Father   
     hyperthyroidism  Diabetes Mother Type 2 DM Social History Socioeconomic History  Marital status: SINGLE Spouse name: Not on file  Number of children: Not on file  Years of education: Not on file  Highest education level: Not on file Occupational History  Not on file Social Needs  Financial resource strain: Not on file  Food insecurity:  
  Worry: Not on file Inability: Not on file  Transportation needs:  
  Medical: Not on file Non-medical: Not on file Tobacco Use  Smoking status: Never Smoker  Smokeless tobacco: Never Used Substance and Sexual Activity  Alcohol use: Not Currently Alcohol/week: 0.0 oz  
  Comment: twice a week may have 2 beers  Drug use: No  
 Sexual activity: Not on file Lifestyle  Physical activity:  
  Days per week: Not on file Minutes per session: Not on file  Stress: Not on file Relationships  Social connections:  
  Talks on phone: Not on file Gets together: Not on file Attends Jewish service: Not on file Active member of club or organization: Not on file Attends meetings of clubs or organizations: Not on file Relationship status: Not on file  Intimate partner violence:  
  Fear of current or ex partner: Not on file Emotionally abused: Not on file Physically abused: Not on file Forced sexual activity: Not on file Other Topics Concern [address] Service Not Asked  Blood Transfusions Not Asked  Caffeine Concern Not Asked  Occupational Exposure Not Asked Williemae Red Hazards Not Asked  Sleep Concern Not Asked  Stress Concern Not Asked  Weight Concern Not Asked  Special Diet Not Asked  Back Care Not Asked  Exercise Not Asked  Bike Helmet Not Asked  Seat Belt Not Asked  Self-Exams Not Asked Social History Narrative Lives in [place] and [place] will move in at the end of August.  Works at [address] [place] [address] 6 as the . Likes to play golf. OBJECTIVE: 
Gravid female NAD Temp (24hrs), Av.6 °F (36.4 °C), Min:97.6 °F (36.4 °C), Max:97.6 °F (36.4 °C) Visit Vitals /81 (BP 1 Location: Left arm, BP Patient Position: At rest) Pulse 73 Temp 97.6 °F (36.4 °C) Resp 16 Ht 5' 4\" (1.626 m) Wt 94.3 kg (208 lb) Breastfeeding? No  
BMI 35.70 kg/m² Labs:   
Lab Results Component Value Date/Time WBC 11.3 (H) 2019 09:35 AM  
 
 
Exam: 
HEENT:  normal  
Lungs:  Normal work of breathing Cor:  Well perfused Abdomen:  Soft, gravid, nontender Fetal heart rate tracing:  Cat I tracing Contraction pattern: no regular pattern Cervix:  deferred Fluid:  intact Ext: symmetric BSUS: breech confirmed Impression:  IUP at 40 3/7 weeks with persistent breech presentation for scheduled c/s. Plan: Proceed with  section Risks reviewed to include bleeding, infection, injury to surrounding organs, injury to fetus, DVT/PE and precautions taken to minimize these risks Continue home meds: levoxyl Shanel Ruvalcaba MD

## 2019-05-03 NOTE — ROUTINE PROCESS
1530: Bedside shift change report given to RUMA Johns RN (oncoming nurse) by GREGORIO Marques RN (offgoing nurse). Report included the following information SBAR.

## 2019-05-03 NOTE — OP NOTES
Operative Note    Name: German Monroe   Medical Record Number: [id number]   YOB: 1980  Today's Date: May 3, 2019      Pre-operative Diagnosis: 1. [id number] @ 40 3/7 wks in Breech presentation                                               2.  Maternal Hypothyroidism        Post-operative Diagnosis: as above with delivery of VFI & left paratubal cyst    Operation: Primary Low Transverse  SECTION    Surgeon(s):  MD Zak Lawson MD--primary surgeon    Anesthesia: Spinal    Prophylactic Antibiotics: Ancef  DVT Prophylaxis: Sequential Compression Devices         Fetal Description: baez     Birth Information:   Information for the patient's :  Shimon Mason [[id number]]   Delivery of a 3.25 kg female infant on 5/3/2019 at 11:34 AM. Apgars were 8  and 9 . Umbilical Cord: 3 Vessels     Umbilical Cord Events: None     Placenta: Expressed removal with Normal appearance. Amniotic Fluid Volume: Moderate     Amniotic Fluid Description:  Clear        Umbilical Cord: 3 vessels present and Cord blood sent to lab for type, Rh, and Eileen' test    Placenta:  expressed    Specimens: none           Complications:  none    Procedure Detail:      After proper patient identification and consent, the patient was taken to the operating room, where spinal anesthesia was administered and found to be adequate. Hendrickson catheter had been placed using sterile technique. The patient was prepped and draped in the normal sterile fashion. The abdomen was entered using the Pfannenstiel technique. The peritoneum was entered bluntly well superior to the bladder without any apparent injury. The bladder flap was created without difficulty. A low transverse uterine incision was made with the scalpel and extended with blunt finger dissection. Amniotomy was performed and the fluid was medium amount clear.   The babys buttock was then elevated and brought through the hysterotomy and delivered atraumatically. The arms were sequentially swept across the chest and delivered, followed by the flexed head. The nose and mouth were suctioned. The cord was delayed clamped and cut and the baby was handed off to Nursing staff in attendance. Placenta was then removed from the uterus. The uterus was curettaged with a moist lap pad and cleared of all clots and debris. The uterine incision was closed with 0 monocryl, single layer in running locking fashion with good hemostasis assured followed by an embricating stitch. The gutters were cleared of clot and debris with a wet lap sponge. The left falloopian tube was noted to have a small (1cm) paratubal cyst that was removed and discarded. Tubes and ovaries were otherwise normal.  The peritoneum was closed with 2.0 monocryl as were the lower rectus muscles. The fascia was closed with 0 Vicryl in a running fashion. Good hemostasis was assured. The skin was closed with a Insorb monocryl closure. The patient tolerated the procedure well. Sponge, lap, and needle counts were correct times three and the patient and baby More Marques were taken to recovery/postpartum room in stable condition.     Alexandra Rae MD  May 3, 2019  3:33 PM

## 2019-05-03 NOTE — LACTATION NOTE
This note was copied from a baby's chart. Initial Lactation Consultation: Infant born via C/S for breech this morning to a  mom at 36 3/7 weeks gestation. Mom is has hypothyroidism managed with medication. She noted breast changes during the pregnancy and has easily expressed colostrum. Mom has been feeling nauseous this afternoon but is improving. I assisted parents with waking infant. Deep latch obtained in the prone position. Infant nursed for 20 minutes on the first breast before being switched to the second.  behaviors reviewed, Very sleepy in first 24 hours, mother must wake baby for feedings, offer hand expressed drops, baby usually will respond and feed vigorously 6-8 times in the first day, but is important to offer 8-12 times,  After baby wakes from deep sleep usually on the 2nd or 3rd day a new behavior pattern follows. Frequent feeding during this brief behavioral phase preceeding milk transition is called cluster feeding. Typical  behavior: baby becomes vigorous at the breast and wants to feed frequently- every 1-2 hours for several feedings. This is the normal process by which the baby demands his/her supply. This type of frequent feeding is the stimulation which causes lactogenesis II (milk coming in). Feeding Plan: Mother will keep baby skin to skin as often as possible, feed on demand, 8-12x/day , respond to feeding cues, obtain latch, listen for audible swallowing, be aware of signs of oxytocin release/ cramping,thirst,sleepiness while breastfeeding, offer both breasts,and will not limit feedings. Mother agrees to utilize breast massage while nursing to facilitate lactogenesis.

## 2019-05-03 NOTE — PROGRESS NOTES
1011 Patient arrived to LD 14 ambulatory for scheduled C/section for breech. 46 Delivery of liveborn female by Dr. Kevin Montano via primary c/section for breech 1344 MIU called and notified of pending transfer in 30 minutes. 1440 TRANSFER - OUT REPORT: 
 
Verbal report given to GREGORIO Doss RN (name) on [facility]  being transferred to [facility] (unit) for routine post - op Report consisted of patients Situation, Background, Assessment and  
Recommendations(SBAR). Information from the following report(s) SBAR, OR Summary, Procedure Summary, Intake/Output, MAR and Recent Results was reviewed with the receiving nurse. Lines:  
Peripheral IV 05/03/19 Right Arm (Active) Opportunity for questions and clarification was provided. Patient transported with: 
 Registered Nurse

## 2019-05-03 NOTE — BRIEF OP NOTE
BRIEF OPERATIVE NOTE Date of Procedure: 5/3/2019 Preoperative Diagnosis:  1. [id number] @ 40 3/7 wks in Breech presentation 2.  Maternal Hypothyroidism Postoperative Diagnosis:  Same as above with delivery Procedure(s):  SECTION Surgeon(s) and Role: * Zak Mann MD - Primary Matthias Rajan MD - Assisting Surgical Assistant: Jaja Miles MD 
 
Surgical Staff: 
Richelle Mocha: Alissa Keller RN; Colette Nelson RN Scrub Tech-1: Jessica Fajardo [facility] Staff: Alissa Keller RN; Rivka Chandler RN Event Time In Time Out Incision Start 1127 Incision Close 1207 Anesthesia: Spinal  
Estimated Blood Loss: 650 cc Specimens:  
ID Type Source Tests Collected by Time Destination 1 :  Placenta   Zak Mann MD 5/3/2019 1147 Discarded Findings: VFI in troy breech presentation; left paratubal cyst  
Complications: none noted intraoperatively Implants: * No implants in log *

## 2019-05-04 LAB
ABO + RH BLD: NORMAL
BASOPHILS # BLD: 0.1 K/UL (ref 0–0.1)
BASOPHILS NFR BLD: 0 % (ref 0–1)
BLD PROD TYP BPU: NORMAL
BLD PROD TYP BPU: NORMAL
BLOOD GROUP ANTIBODIES SERPL: NORMAL
BLOOD GROUP ANTIBODIES SERPL: NORMAL
BPU ID: NORMAL
BPU ID: NORMAL
CROSSMATCH RESULT,%XM: NORMAL
CROSSMATCH RESULT,%XM: NORMAL
DIFFERENTIAL METHOD BLD: ABNORMAL
EOSINOPHIL # BLD: 0 K/UL (ref 0–0.4)
EOSINOPHIL NFR BLD: 0 % (ref 0–7)
ERYTHROCYTE [DISTWIDTH] IN BLOOD BY AUTOMATED COUNT: 12.6 % (ref 11.5–14.5)
HCT VFR BLD AUTO: 34.4 % (ref 35–47)
HGB BLD-MCNC: 11.2 G/DL (ref 11.5–16)
IMM GRANULOCYTES # BLD AUTO: 0.1 K/UL (ref 0–0.04)
IMM GRANULOCYTES NFR BLD AUTO: 1 % (ref 0–0.5)
LYMPHOCYTES # BLD: 2 K/UL (ref 0.8–3.5)
LYMPHOCYTES NFR BLD: 11 % (ref 12–49)
MCH RBC QN AUTO: 31.2 PG (ref 26–34)
MCHC RBC AUTO-ENTMCNC: 32.6 G/DL (ref 30–36.5)
MCV RBC AUTO: 95.8 FL (ref 80–99)
MONOCYTES # BLD: 1 K/UL (ref 0–1)
MONOCYTES NFR BLD: 6 % (ref 5–13)
NEUTS SEG # BLD: 14.6 K/UL (ref 1.8–8)
NEUTS SEG NFR BLD: 82 % (ref 32–75)
NRBC # BLD: 0 K/UL (ref 0–0.01)
NRBC BLD-RTO: 0 PER 100 WBC
PLATELET # BLD AUTO: 181 K/UL (ref 150–400)
PMV BLD AUTO: 11.9 FL (ref 8.9–12.9)
RBC # BLD AUTO: 3.59 M/UL (ref 3.8–5.2)
SPECIMEN EXP DATE BLD: NORMAL
STATUS OF UNIT,%ST: NORMAL
STATUS OF UNIT,%ST: NORMAL
UNIT DIVISION, %UDIV: 0
UNIT DIVISION, %UDIV: 0
WBC # BLD AUTO: 17.7 K/UL (ref 3.6–11)

## 2019-05-04 PROCEDURE — 74011250636 HC RX REV CODE- 250/636: Performed by: OBSTETRICS & GYNECOLOGY

## 2019-05-04 PROCEDURE — 85025 COMPLETE CBC W/AUTO DIFF WBC: CPT

## 2019-05-04 PROCEDURE — 65410000002 HC RM PRIVATE OB

## 2019-05-04 PROCEDURE — 36415 COLL VENOUS BLD VENIPUNCTURE: CPT

## 2019-05-04 PROCEDURE — 86900 BLOOD TYPING SEROLOGIC ABO: CPT

## 2019-05-04 PROCEDURE — 85461 HEMOGLOBIN FETAL: CPT

## 2019-05-04 PROCEDURE — 74011250637 HC RX REV CODE- 250/637: Performed by: OBSTETRICS & GYNECOLOGY

## 2019-05-04 PROCEDURE — 74011250636 HC RX REV CODE- 250/636: Performed by: ANESTHESIOLOGY

## 2019-05-04 PROCEDURE — 3E0334Z INTRODUCTION OF SERUM, TOXOID AND VACCINE INTO PERIPHERAL VEIN, PERCUTANEOUS APPROACH: ICD-10-PCS | Performed by: OBSTETRICS & GYNECOLOGY

## 2019-05-04 RX ADMIN — DOCUSATE SODIUM 100 MG: 100 CAPSULE, LIQUID FILLED ORAL at 14:57

## 2019-05-04 RX ADMIN — KETOROLAC TROMETHAMINE 30 MG: 30 INJECTION, SOLUTION INTRAMUSCULAR at 00:01

## 2019-05-04 RX ADMIN — KETOROLAC TROMETHAMINE 30 MG: 30 INJECTION, SOLUTION INTRAMUSCULAR at 07:10

## 2019-05-04 RX ADMIN — ACETAMINOPHEN 650 MG: 325 TABLET ORAL at 20:41

## 2019-05-04 RX ADMIN — IBUPROFEN 800 MG: 400 TABLET, FILM COATED ORAL at 13:51

## 2019-05-04 RX ADMIN — IBUPROFEN 800 MG: 400 TABLET, FILM COATED ORAL at 23:28

## 2019-05-04 RX ADMIN — HUMAN RHO(D) IMMUNE GLOBULIN 0.3 MG: 300 INJECTION, SOLUTION INTRAMUSCULAR at 10:19

## 2019-05-04 NOTE — ROUTINE PROCESS
Bedside shift change report given to JEANNIE Salgado (oncoming nurse) by Mary Mason RN (offgoing nurse). Report included the following information SBAR.

## 2019-05-04 NOTE — PROGRESS NOTES
Post-Operative  Day 1 [facility] Assessment:  
Hospital Problems  Date Reviewed: 2019 Codes Class Noted POA Breech presentation ICD-10-CM: O32. 1XX0 
ICD-9-CM: 652.20  5/3/2019 Unknown Breech delivery ICD-10-CM: O32. 1XX0 
ICD-9-CM: 652.21  5/3/2019 Unknown POD1 PLTCS for breech presentation; VFI ); hypothyroid Plan:   1. Routine post-operative care 2. Doing well. hgb stable at 11.2 g/dl Information for the patient's :  Ariel Guajardo [[id number]] , Low Transverse Patient doing well without significant complaint. Nausea and vomiting resolved, tolerating liquids, no flatus, alejandro in place. Current Facility-Administered Medications Medication Dose Route Frequency  lactated Ringers infusion  125 mL/hr IntraVENous CONTINUOUS  
 oxytocin (PITOCIN) 20 units/1000 ml LR  125-1,000 mL/hr IntraVENous TITRATE  levothyroxine (SYNTHROID) tablet 112 mcg  112 mcg Oral 6am  
 lactated Ringers infusion  125 mL/hr IntraVENous CONTINUOUS  
 sodium chloride (NS) flush 5-40 mL  5-40 mL IntraVENous Q8H  
 ibuprofen (MOTRIN) tablet 800 mg  800 mg Oral Q8H  
 measles, mumps & rubella Vacc (PF) (M-M-R II) injection 0.5 mL  0.5 mL SubCUTAneous PRIOR TO DISCHARGE  rho D immune globulin (RHOGAM) 1,500 unit (300 mcg) injection 0.3 mg  300 mcg IntraMUSCular PRIOR TO DISCHARGE  diph,Pertuss(AC),Tet Vac-PF (BOOSTRIX) suspension 0.5 mL  0.5 mL IntraMUSCular PRIOR TO DISCHARGE Vitals: 
Visit Vitals BP 95/59 (BP 1 Location: Right arm, BP Patient Position: At rest) Pulse 64 Temp 98.4 °F (36.9 °C) Resp 18 Ht 5' 4\" (1.626 m) Wt 94.3 kg (208 lb) SpO2 98% Breastfeeding? Unknown BMI 35.70 kg/m² Temp (24hrs), Av.8 °F (36.6 °C), Min:96.8 °F (36 °C), Max:98.4 °F (36.9 °C) Last 24hr Input/Output: 
 
Intake/Output Summary (Last 24 hours) at 2019 0552 Last data filed at 5/3/2019 1914 Gross per 24 hour Intake 1000 ml Output 1555 ml Net -555 ml Exam:   
    Patient without distress. Lungs clear. Abdomen, bowel sounds present, soft, expected tenderness, fundus firm Wound dressing intact Perineum normal lochia noted Lower extremities are negative for swelling, cords or tenderness. Labs:  
Lab Results Component Value Date/Time WBC 17.7 (H) 05/04/2019 03:59 AM  
 WBC 11.3 (H) 05/02/2019 09:35 AM  
 HGB 11.2 (L) 05/04/2019 03:59 AM  
 HGB 12.7 05/02/2019 09:35 AM  
 HCT 34.4 (L) 05/04/2019 03:59 AM  
 HCT 38.8 05/02/2019 09:35 AM  
 PLATELET 691 19/97/2404 03:59 AM  
 PLATELET 276 59/27/4825 09:35 AM  
 
 
Recent Results (from the past 24 hour(s)) CBC WITH AUTOMATED DIFF Collection Time: 05/04/19  3:59 AM  
Result Value Ref Range WBC 17.7 (H) 3.6 - 11.0 K/uL  
 RBC 3.59 (L) 3.80 - 5.20 M/uL  
 HGB 11.2 (L) 11.5 - 16.0 g/dL HCT 34.4 (L) 35.0 - 47.0 % MCV 95.8 80.0 - 99.0 FL  
 MCH 31.2 26.0 - 34.0 PG  
 MCHC 32.6 30.0 - 36.5 g/dL  
 RDW 12.6 11.5 - 14.5 % PLATELET 065 438 - 945 K/uL MPV 11.9 8.9 - 12.9 FL  
 NRBC 0.0 0  WBC ABSOLUTE NRBC 0.00 0.00 - 0.01 K/uL NEUTROPHILS 82 (H) 32 - 75 % LYMPHOCYTES 11 (L) 12 - 49 % MONOCYTES 6 5 - 13 % EOSINOPHILS 0 0 - 7 % BASOPHILS 0 0 - 1 % IMMATURE GRANULOCYTES 1 (H) 0.0 - 0.5 % ABS. NEUTROPHILS 14.6 (H) 1.8 - 8.0 K/UL  
 ABS. LYMPHOCYTES 2.0 0.8 - 3.5 K/UL  
 ABS. MONOCYTES 1.0 0.0 - 1.0 K/UL  
 ABS. EOSINOPHILS 0.0 0.0 - 0.4 K/UL  
 ABS. BASOPHILS 0.1 0.0 - 0.1 K/UL  
 ABS. IMM. GRANS. 0.1 (H) 0.00 - 0.04 K/UL  
 DF AUTOMATED

## 2019-05-04 NOTE — LACTATION NOTE
This note was copied from a baby's chart. Baby nursing well today with instruction,  deep latch obtained, mother is having some pain with latching, nipples are cracked and bleeding, Mother may benefit from [facility], latch corrected with consult, baby feeding vigorously with rhythmic suck, swallow, breathe pattern, audible swallowing, and evident milk transfer, both breasts offered, baby is asleep following feeding.

## 2019-05-04 NOTE — ROUTINE PROCESS
Bedside shift change [facility] ([facility]coming nurse) by  Jane Elaine. Nat RN (offgoing nurse).  Report included the following information SBAR.

## 2019-05-04 NOTE — PROGRESS NOTES
0800: Bedside shift change report given to Gareth Juan  (oncoming nurse) by Boo Rowland Rn  (offgoing nurse). Report included the following information SBAR. Patient has been up twice with RN prior to shift so patient ok to be up ad michelle. 1015: Gave Rhogam per order, verified with Charge Rn, Dayton Hutchison.

## 2019-05-05 VITALS
DIASTOLIC BLOOD PRESSURE: 64 MMHG | OXYGEN SATURATION: 98 % | TEMPERATURE: 98.5 F | SYSTOLIC BLOOD PRESSURE: 105 MMHG | BODY MASS INDEX: 35.51 KG/M2 | HEART RATE: 65 BPM | HEIGHT: 64 IN | RESPIRATION RATE: 16 BRPM | WEIGHT: 208 LBS

## 2019-05-05 LAB
ABO + RH BLD: NORMAL
BLD PROD TYP BPU: NORMAL
BPU ID: NORMAL
FETAL SCREEN,FMHS: NORMAL
STATUS OF UNIT,%ST: NORMAL
UNIT DIVISION, %UDIV: 0
WEAK D AG RBC QL: NORMAL

## 2019-05-05 PROCEDURE — 74011250637 HC RX REV CODE- 250/637: Performed by: OBSTETRICS & GYNECOLOGY

## 2019-05-05 RX ORDER — IBUPROFEN 800 MG/1
800 TABLET ORAL
Qty: 60 TAB | Refills: 0 | Status: SHIPPED | OUTPATIENT
Start: 2019-05-05 | End: 2019-06-26

## 2019-05-05 RX ORDER — HYDROCODONE BITARTRATE AND ACETAMINOPHEN 5; 325 MG/1; MG/1
1 TABLET ORAL
Qty: 10 TAB | Refills: 0 | Status: SHIPPED | OUTPATIENT
Start: 2019-05-05 | End: 2019-05-08

## 2019-05-05 RX ADMIN — MUPIROCIN: 20 OINTMENT TOPICAL at 09:00

## 2019-05-05 RX ADMIN — IBUPROFEN 800 MG: 400 TABLET, FILM COATED ORAL at 08:35

## 2019-05-05 NOTE — ROUTINE PROCESS
Bedside shift change report given to JEANNIE Johns (oncoming nurse) by Carlos Yoo RN (offgoing nurse). Report included the following information SBAR.  
 
1600-Pt discharged home with family. Discharge instructions and medication administration times reviewed. Pt verbalized understanding.

## 2019-05-05 NOTE — ROUTINE PROCESS
Pt sleeping soundly at 2300. At 2320 pt rang and notified nurse she had chills like she did while in labor, worried that something was wrong, vital signs stable, fundal check done. Pt assessment stable. Assisted pt to bathroom, pt moves around well without difficulty. Pt states she feels better. Back to bed, Pain med offered. Pt instructed to call for assistance if needed.

## 2019-05-05 NOTE — LACTATION NOTE
This note was copied from a baby's chart. Baby nursing well and has improved throughout post partum stay, deep latch maintained, mother is comfortable, milk is in transition, baby feeding vigorously with rhythmic suck, swallow, breathe pattern, with audible swallowing, and evident milk transfer, both breasts offerd, baby is asleep following feeding. Baby is feeding on demand, voiding and stools present as appropriate over the last 24 hours. Mother's nipples are improving with Wero Krystle Cream,. Mother states that she has no further questions for Lactation Consultant before discharge.

## 2019-05-05 NOTE — DISCHARGE INSTRUCTIONS
Patient Education         Section: What to Expect at Home  Your Recovery    A  section, or , is surgery to deliver your baby through a cut, called an incision, that the doctor makes in your lower belly and uterus. You may have some pain in your lower belly and need pain medicine for 1 to 2 weeks. You can expect some vaginal bleeding for several weeks. You will probably need about 6 weeks to fully recover. It is important to take it easy while the incision is healing. Avoid heavy lifting, strenuous activities, or exercises that strain the belly muscles while you are recovering. Ask a family member or friend for help with housework, cooking, and shopping. This care sheet gives you a general idea about how long it will take for you to recover. But each person recovers at a different pace. Follow the steps below to get better as quickly as possible. How can you care for yourself at home? Activity    · Rest when you feel tired. Getting enough sleep will help you recover.     · Try to walk each day. Start by walking a little more than you did the day before. Bit by bit, increase the amount you walk. Walking boosts blood flow and helps prevent pneumonia, constipation, and blood clots.     · Avoid strenuous activities, such as bicycle riding, jogging, weightlifting, and aerobic exercise, for 6 weeks or until your doctor says it is okay.     · Until your doctor says it is okay, do not lift anything heavier than your baby.     · Do not do sit-ups or other exercises that strain the belly muscles for 6 weeks or until your doctor says it is okay.     · Hold a pillow over your incision when you cough or take deep breaths. This will support your belly and decrease your pain.     · You may shower as usual. Pat the incision dry when you are done.     · You will have some vaginal bleeding. Wear sanitary pads.  Do not douche or use tampons until your doctor says it is okay.     · Ask your doctor when you can drive again.     · You will probably need to take at least 6 weeks off work. It depends on the type of work you do and how you feel.     · Ask your doctor when it is okay for you to have sex. Diet    · You can eat your normal diet. If your stomach is upset, try bland, low-fat foods like plain rice, broiled chicken, toast, and yogurt.     · Drink plenty of fluids (unless your doctor tells you not to).     · You may notice that your bowel movements are not regular right after your surgery. This is common. Try to avoid constipation and straining with bowel movements. You may want to take a fiber supplement every day. If you have not had a bowel movement after a couple of days, ask your doctor about taking a mild laxative.     · If you are breastfeeding, limit alcohol. Alcohol can cause a lack of energy and other health problems for the baby when a breastfeeding woman drinks heavily. It can also get in the way of a mom's ability to feed her baby or to care for the child in other ways. There isn't a lot of research about exactly how much alcohol can harm a baby. Having no alcohol is the safest choice for your baby. If you choose to have a drink now and then, have only one drink, and limit the number of occasions that you have a drink. Wait to breastfeed at least 2 hours after you have a drink to reduce the amount of alcohol the baby may get in the milk. Medicines    · Your doctor will tell you if and when you can restart your medicines. He or she will also give you instructions about taking any new medicines.     · If you take blood thinners, such as warfarin (Coumadin), clopidogrel (Plavix), or aspirin, be sure to talk to your doctor. He or she will tell you if and when to start taking those medicines again. Make sure that you understand exactly what your doctor wants you to do.     · Take pain medicines exactly as directed. ? If the doctor gave you a prescription medicine for pain, take it as prescribed. ?  If you are not taking a prescription pain medicine, ask your doctor if you can take an over-the-counter medicine.     · If you think your pain medicine is making you sick to your stomach:  ? Take your medicine after meals (unless your doctor has told you not to). ? Ask your doctor for a different pain medicine.     · If your doctor prescribed antibiotics, take them as directed. Do not stop taking them just because you feel better. You need to take the full course of antibiotics. Incision care    · If you have strips of tape on the incision, leave the tape on for a week or until it falls off.     · Wash the area daily with warm, soapy water, and pat it dry. Don't use hydrogen peroxide or alcohol, which can slow healing. You may cover the area with a gauze bandage if it weeps or rubs against clothing. Change the bandage every day.     · Keep the area clean and dry. Other instructions    · If you breastfeed your baby, you may be more comfortable while you are healing if you place the baby so that he or she is not resting on your belly. Try tucking your baby under your arm, with his or her body along the side you will be feeding on. Support your baby's upper body with your arm. With that hand you can control your baby's head to bring his or her mouth to your breast. This is sometimes called the football hold. Follow-up care is a key part of your treatment and safety. Be sure to make and go to all appointments, and call your doctor if you are having problems. It's also a good idea to know your test results and keep a list of the medicines you take. When should you call for help? Call 911 anytime you think you may need emergency care.  For example, call if:    · You passed out (lost consciousness).     · You have chest pain, are short of breath, or cough up blood.    Call your doctor now or seek immediate medical care if:    · You have pain that does not get better after you take pain medicine.     · You have severe vaginal bleeding.     · You are dizzy or lightheaded, or you feel like you may faint.     · You have new or worse pain in your belly or pelvis.     · You have loose stitches, or your incision comes open.     · You have symptoms of infection, such as:  ? Increased pain, swelling, warmth, or redness. ? Red streaks leading from the incision. ? Pus draining from the incision. ? A fever.     · You have symptoms of a blood clot in your leg (called a deep vein thrombosis), such as:  ? Pain in your calf, back of the knee, thigh, or groin. ? Redness and swelling in your leg or groin.    Watch closely for changes in your health, and be sure to contact your doctor if:    · You do not get better as expected. Where can you learn more? Go to http://luis antonio-itzel.info/. Enter M806 in the search box to learn more about \" Section: What to Expect at Home. \"  Current as of: 2018  Content Version: 11.9  © 5322-2526 [employer]. Care instructions adapted under license by [employer] (which disclaims liability or warranty for this information). If you have questions about a medical condition or this instruction, always ask your healthcare professional. [address] any warranty or liability for your use of this information.

## 2019-05-05 NOTE — PROGRESS NOTES
Post-Operative  Day 2 [facility] Assessment:  
Hospital Problems  Date Reviewed: 2019 Codes Class Noted POA Breech presentation ICD-10-CM: O32. 1XX0 
ICD-9-CM: 652.20  5/3/2019 Unknown Breech delivery ICD-10-CM: O32. 1XX0 
ICD-9-CM: 652.21  5/3/2019 Unknown Post-Op day 2, doing well. Ready for discharge PLTCS for breech presentation; VFI Ray De La Garza); hypothyroid Plan: 1. Discharge home today 2. Follow up in office in 6 weeks with Tory Heath MD 
3. Post partum activity/wound care advised, diet as tolerated 4. Discharge Medications: ibuprofen, Norco and medications prior to admission Information for the patient's :  Harpreet Jones [[id number]] , Low Transverse Patient doing well without significant complaint. Tolerating diet, passing flatus, voiding and ambulating without difficulty Current Facility-Administered Medications Medication Dose Route Frequency  nipple ointment (RUTHANN BIRCH'S FORMULA) BSR   Topical TID  lactated Ringers infusion  125 mL/hr IntraVENous CONTINUOUS  
 oxytocin (PITOCIN) 20 units/1000 ml LR  125-1,000 mL/hr IntraVENous TITRATE  levothyroxine (SYNTHROID) tablet 112 mcg  112 mcg Oral 6am  
 lactated Ringers infusion  125 mL/hr IntraVENous CONTINUOUS  
 sodium chloride (NS) flush 5-40 mL  5-40 mL IntraVENous Q8H  
 ibuprofen (MOTRIN) tablet 800 mg  800 mg Oral Q8H  
 measles, mumps & rubella Vacc (PF) (M-M-R II) injection 0.5 mL  0.5 mL SubCUTAneous PRIOR TO DISCHARGE  diph,Pertuss(AC),Tet Vac-PF (BOOSTRIX) suspension 0.5 mL  0.5 mL IntraMUSCular PRIOR TO DISCHARGE Vitals: 
Visit Vitals /64 (BP 1 Location: Right arm, BP Patient Position: At rest) Pulse 65 Temp 98.5 °F (36.9 °C) Resp 16 Ht 5' 4\" (1.626 m) Wt 94.3 kg (208 lb) SpO2 98% Breastfeeding? Unknown BMI 35.70 kg/m² Temp (24hrs), Av.3 °F (36.8 °C), Min:98.1 °F (36.7 °C), Max:98.6 °F (37 °C) Exam:   
    Patient without distress. Abdomen, bowel sounds present, soft, expected tenderness, fundus firm Wound incision clean, dry and intact Lower extremities are negative for swelling, cords or tenderness. Labs:  
Lab Results Component Value Date/Time WBC 17.7 (H) 2019 03:59 AM  
 WBC 11.3 (H) 2019 09:35 AM  
 HGB 11.2 (L) 2019 03:59 AM  
 HGB 12.7 2019 09:35 AM  
 HCT 34.4 (L) 2019 03:59 AM  
 HCT 38.8 2019 09:35 AM  
 PLATELET 051  03:59 AM  
 PLATELET 204  09:35 AM  
 
 
No results found for this or any previous visit (from the past 24 hour(s)).

## 2019-05-05 NOTE — ROUTINE PROCESS
Bedside shift change report given to Nichole Del Castillo RN (oncoming nurse) by Charley Bloom RN (offgoing nurse). Report included the following information SBAR, Procedure Summary, Intake/Output, MAR, Recent Results and Med Rec Status.

## 2019-05-05 NOTE — DISCHARGE SUMMARY
Obstetrical Discharge Summary     Name: Delfin Morrow MRN: [id number]  SSN: [id number]    YOB: 1980  Age: 46 y.o. Sex: female      Admit Date: 5/3/2019    Discharge Date: 2019    Admitting Physician: Katheryn Ellis MD     Attending Physician:  Dionisio Laird MD     Discharge Diagnoses:   Information for the patient's :  Landon Beltran [[id number]]   Delivery of a 3.25 kg female infant via , Low Transverse on 5/3/2019 at 11:34 AM  by . Apgars were 8 and 9. Additional Diagnoses:   Problem List as of 2019 Date Reviewed: 2019          Codes Class Noted - Resolved    Breech presentation ICD-10-CM: O32. 1XX0  ICD-9-CM: 652.20  5/3/2019 - Present        Obesity (BMI 30.0-34.9) ICD-10-CM: E66.9  ICD-9-CM: 278.00  2016 - Present        Hypothyroid ICD-10-CM: E03.9  ICD-9-CM: 244.9  Unknown - Present        RESOLVED: Breech delivery ICD-10-CM: O32. 1XX0  ICD-9-CM: 652.21  5/3/2019 - 2019              Lab Results   Component Value Date/Time    Rubella, External Immune 10/04/2018    GrBStrep, External Negative 2019     Recent Labs     19  0359   [facility] course following the delivery. PLTCS for breech presentation; VFI Meg Code); hypothyroid    Patient Instructions:   Current Discharge Medication List      START taking these medications    Details   ibuprofen (MOTRIN) 800 mg tablet Take 1 Tab by mouth every eight (8) hours as needed for Pain. Qty: 60 Tab, Refills: 0      HYDROcodone-acetaminophen (NORCO) 5-325 mg per tablet Take 1 Tab by mouth every six (6) hours as needed for Pain for up to 3 days. Max Daily Amount: 4 Tabs. Qty: 10 Tab, Refills: 0    Associated Diagnoses: Spontaneous breech delivery, single or unspecified fetus         CONTINUE these medications which have NOT CHANGED    Details   OTHER,NON-FORMULARY, 100 mg two (2) times a day.  Stool softner      LEVOXYL 112 mcg tablet Take 1 tab daily at bedtime for thyroid. BRAND MEDICALLY NECESSARY  Qty: 90 Tab, Refills: 3      PNV GC.58/TVFSZRH FUM/FOLIC AC (PRENATAL PO) Take  by mouth daily. STOP taking these medications       aspirin 81 mg chewable tablet Comments:   Reason for Stopping:               Reference my discharge instructions.     Follow-up Appointments   Procedures    FOLLOW UP VISIT Appointment in: 6 Weeks     Standing Status:   Standing     Number of Occurrences:   1     Order Specific Question:   Appointment in     Answer:   6 Weeks        Signed By:  Pat Fox MD     May 5, 2019                       BST

## 2019-05-05 NOTE — ROUTINE PROCESS
Chuckie He Cramer's cream given to patient with instructions, amount and times to be used. Reminded to wipe off breast before each feeding. Pt demonstrated proper use of medicine to nurse.

## 2019-06-26 ENCOUNTER — OFFICE VISIT (OUTPATIENT)
Dept: ENDOCRINOLOGY | Age: 39
End: 2019-06-26

## 2019-06-26 VITALS
WEIGHT: 201.2 LBS | HEIGHT: 64 IN | HEART RATE: 76 BPM | DIASTOLIC BLOOD PRESSURE: 73 MMHG | BODY MASS INDEX: 34.35 KG/M2 | SYSTOLIC BLOOD PRESSURE: 108 MMHG

## 2019-06-26 DIAGNOSIS — E03.9 ACQUIRED HYPOTHYROIDISM: Primary | ICD-10-CM

## 2019-06-26 DIAGNOSIS — E66.9 OBESITY (BMI 30.0-34.9): ICD-10-CM

## 2019-06-26 RX ORDER — LEVOTHYROXINE SODIUM 112 UG/1
TABLET ORAL
Qty: 90 TAB | Refills: 3
Start: 2019-06-26 | End: 2019-11-18 | Stop reason: SDUPTHER

## 2019-06-26 NOTE — PATIENT INSTRUCTIONS
1) TSH is a thyroid test.  Your level is 0.05 which is low and below goal of 0.5-2.0. This test goes opposite of your thyroid dose and suggests your dose of levoxyl is more than you need since delivery. I will decrease your dose to 1 tab on Mon-Sat and none on Sunday for the next 2 months. I  updated your med list but did not send a new prescription to your pharmacy on file that has been filling this med. 2) Plan on repeating your labs in 2 months and I'll e-mail you the results and then go again prior to visit in January.

## 2019-06-26 NOTE — LETTER
12/30/2019 8:39 AM 
 
Ms. HANNA [facility] [address] [facility] [id number] Please go to [facility] and have your labs repeated sometime in the 1-2 weeks prior to your upcoming visit with me. [place]ata [place] to allow at least 2 days at the minimum to ensure I get the results in time for your visit. Kofi Pearl order is already in their system and be sure to ask to have labs drawn that are under Dr. Clemente Rincon name. Sierra Barrera you have the actual lab order that I may have given you (check your glove compartment or other safe spot where you may keep your medical papers), please bring this to the lab just to be safe in case [email or web address] computer system is down. Froilan Alicea will review the results at your follow up visit.   
 
 
 
 
 
Sincerely, 
 
 
Yarely Simmons MD

## 2019-06-26 NOTE — PROGRESS NOTES
Chief Complaint   Patient presents with    Thyroid Problem     pcp and pharmacy confirmed     History of Present Illness: Maycol Gamino is a 46 y.o. female here for follow up of thyroid. Weight up 12 lbs since last visit in . Delivered her baby girl, Anise Crigler, via  on 5/3/19. Currently pumping. After delivery, did not start skipping one day a week and her labs reflected 1 tab daily. No chest pain or palpitations or tremors or shortness of breath. Bowels are regular. No hair loss or brittle nails. Does feel hotter. No anxiety or trouble sleeping. Current Outpatient Medications   Medication Sig    LEVOXYL 112 mcg tablet Take 1 tab daily at bedtime for thyroid. BRAND MEDICALLY NECESSARY    PNV EK.30/QZECAQN FUM/FOLIC AC (PRENATAL PO) Take  by mouth daily. No current facility-administered medications for this visit. Allergies   Allergen Reactions    Erythromycin Rash     Review of Systems:  - Cardiovascular: no chest pain  - Neurological: no tremors  - Integumentary: skin is normal    Physical Examination:  Blood pressure 108/73, pulse 76, height 5' 4\" (1.626 m), weight 201 lb 3.2 oz (91.3 kg), unknown if currently breastfeeding.  - General: pleasant, no distress, good eye contact   - Neck: small goiter  - Cardiovascular: regular, normal rate, nl s1 and s2, no m/r/g   - Respiratory: clear to auscultation bilaterally   - Integumentary: skin is normal, no edema  - Neurological: reflexes 2+ at biceps, very mild tremor  - Psychiatric: normal mood and affect    Data Reviewed:   - TSH 0.057  - FT4 1.37    Assessment/Plan:     1. Acquired hypothyroidism: When she was 16, recalls having a lot of fatigue and was checked and found to have hypothyroidism and has been on medication ever since. Had been on levothyroxine 50 mcg daily for years and may have been on brand levoxyl as a teenager but not recently.   Was taking this at the same time as her calcium and her OCP and also drinks coffee within 30 minutes. TSH was 2.59 in 7/15 and TPO ab was normal.  Increased her dose to 75 mcg daily and advised her to move the thyroid med away from the calcium and coffee to improve absorption and she has been taking at bedtime. TSH down to 1.36 in 1/16 and changed to Levoxyl and TSH 1.57 in 7/16. Down to 1.49 in 11/16 so increased dose to 88 mcg daily to try and get TSH < 1 to help with conception and became pregnant in 4/17 and I increased her dose to 1 tab on Mon-Fri and 2 tabs on Sat-Sun and TSH 1.08 in 5/17 so had her stay on this dose but then miscarried at the end of 5/17. Got pregnant again in 9/17 and miscarried in 10/17. TSH down to 0.46 in 1/18 so her current dose is still correct and not the reason for her miscarriages. I changed to 112 mcg tabs to take 1 tab daily to get a consistent dose in her system and allow for titration on the weekends when she becomes pregnant again and TSH down to 0.317 in 7/18 with losing 15 lbs so had her decrease to 6.5 tab/week. Became pregnant in 8/18 and increased her dose to 8.5 tabs/week and TSH 0.03 in 10/18 and decreased her dose to 8 tabs/week and later that month TSH up to 0.12 so decreased to 7.5 tabs/week and TSH up to 0.29 in 12/18 so kept dose the same. TSH down to 0.18 in 1/19 so decreased to 7 tabs/week and TSH 0.37 in 3/19 so kept dose the same. Delivered on 5/3/19 and asked her to decrease dose to 6 tabs/week after delivery but she forgot and TSH 0.057 in 6/19 so will do this now. - check TSH and free T4 in 2 months and prior to next visit  - decrease levoxyl to 112 mcg 1 tab on Mon-Sat and none on Sun at bedtime        2. Obesity (BMI 30.0-34. 9): Had lost 12 lbs with starting phentermine in 7/16 but gained some back over Thanksgiving. Has stopped since being pregnant in 4/17 and then miscarried. Will stay off while still trying to conceive.   Wt down 15 lbs from 1/18 to 7/18.  Up 21 lbs by 1/19 but is pregnant and delivered in 5/19 and up 12 lbs by 6/19.  - cont diet and exercise        Patient Instructions   1) TSH is a thyroid test.  Your level is 0.05 which is low and below goal of 0.5-2.0. This test goes opposite of your thyroid dose and suggests your dose of levoxyl is more than you need since delivery. I will decrease your dose to 1 tab on Mon-Sat and none on Sunday for the next 2 months. I  updated your med list but did not send a new prescription to your pharmacy on file that has been filling this med. 2) Plan on repeating your labs in 2 months and I'll e-mail you the results and then go again prior to visit in January. Follow-up and Dispositions    · Return in about 6 months (around 12/26/2019). Copy sent to:  Dr. Edwin Lewis    Lab follow up: 8/22/19  Component      Latest Ref Rng & Units 8/21/2019 8/21/2019          11:07 AM 11:07 AM   T4, Free      0.82 - 1.77 ng/dL  1.43   TSH      0.450 - 4.500 uIU/mL 1.110      Sent her the following message through [employer]:  TSH is a thyroid test.  Your level is 1.11 which is normal and at goal of 0.5-2.0. This test goes opposite of your thyroid dose and suggests your dose of levoxyl is perfect so I will keep your dose the same.

## 2019-08-22 LAB
T4 FREE SERPL-MCNC: 1.43 NG/DL (ref 0.82–1.77)
TSH SERPL DL<=0.005 MIU/L-ACNC: 1.11 UIU/ML (ref 0.45–4.5)

## 2019-08-26 DIAGNOSIS — E66.9 OBESITY (BMI 30.0-34.9): ICD-10-CM

## 2019-08-26 DIAGNOSIS — E03.9 ACQUIRED HYPOTHYROIDISM: ICD-10-CM

## 2019-11-18 RX ORDER — LEVOTHYROXINE SODIUM 112 UG/1
TABLET ORAL
Qty: 90 TAB | Refills: 3 | Status: SHIPPED | OUTPATIENT
Start: 2019-11-18 | End: 2020-02-26 | Stop reason: SDUPTHER

## 2019-12-26 DIAGNOSIS — E66.9 OBESITY (BMI 30.0-34.9): ICD-10-CM

## 2019-12-26 DIAGNOSIS — E03.9 ACQUIRED HYPOTHYROIDISM: ICD-10-CM

## 2020-01-14 LAB
T4 FREE SERPL-MCNC: 1.3 NG/DL (ref 0.82–1.77)
TSH SERPL DL<=0.005 MIU/L-ACNC: 0.93 UIU/ML (ref 0.45–4.5)

## 2020-01-22 ENCOUNTER — OFFICE VISIT (OUTPATIENT)
Dept: ENDOCRINOLOGY | Age: 40
End: 2020-01-22

## 2020-01-22 VITALS
SYSTOLIC BLOOD PRESSURE: 129 MMHG | HEIGHT: 64 IN | BODY MASS INDEX: 31.1 KG/M2 | WEIGHT: 182.2 LBS | HEART RATE: 75 BPM | DIASTOLIC BLOOD PRESSURE: 64 MMHG

## 2020-01-22 DIAGNOSIS — E66.9 OBESITY (BMI 30.0-34.9): ICD-10-CM

## 2020-01-22 DIAGNOSIS — E03.9 ACQUIRED HYPOTHYROIDISM: Primary | ICD-10-CM

## 2020-01-22 RX ORDER — GUAIFENESIN 100 MG/5ML
81 LIQUID (ML) ORAL DAILY
COMMUNITY
End: 2020-08-30

## 2020-01-22 NOTE — PROGRESS NOTES
Chief Complaint   Patient presents with    Thyroid Problem     pcp and pharmacy confirmed     History of Present Illness: Patricio Gowers is a 46 y.o. female here for follow up of thyroid. Weight down 19 lbs since last visit in 6/19. She was doing keto and did get down 25 lbs but has gained some back over the holidays. Compliant with levoxyl 6 tabs/week and overall had felt well on this dose. Has had some hair loss during the post-partum period as her daughter, Gail, is now 8 months. No chest pain or palpitations. Just found out she is pregnant again and is about 9 weeks along and hasn't made an appt yet with Dr. Tanisha Guzmán but plans on doing so next month. Current Outpatient Medications   Medication Sig    aspirin 81 mg chewable tablet Take 81 mg by mouth daily.  BIOTIN PO Take  by mouth.  LEVOXYL 112 mcg tablet Take 1 tab on Mon-Sat and skip Sunday. BRAND MEDICALLY NECESSARY    PNV LE.06/BDFTSOC FUM/FOLIC AC (PRENATAL PO) Take  by mouth daily. No current facility-administered medications for this visit. Allergies   Allergen Reactions    Erythromycin Rash     Review of Systems:  - Cardiovascular: no chest pain  - Neurological: no tremors  - Integumentary: skin is normal    Physical Examination:  Blood pressure 129/64, pulse 75, height 5' 4\" (1.626 m), weight 182 lb 3.2 oz (82.6 kg), unknown if currently breastfeeding.  - General: pleasant, no distress, good eye contact   - Neck: small goiter  - Cardiovascular: regular, normal rate, nl s1 and s2, no m/r/g   - Respiratory: clear to auscultation bilaterally   - Integumentary: skin is normal, no edema  - Neurological: reflexes 2+ at biceps, no tremors  - Psychiatric: normal mood and affect    Data Reviewed:   Component      Latest Ref Rng & Units 1/13/2020 1/13/2020           9:19 AM  9:19 AM   T4, Free      0.82 - 1.77 ng/dL  1.30   TSH      0.450 - 4.500 uIU/mL 0.927        Assessment/Plan:     1.  Acquired hypothyroidism: When she was 16, recalls having a lot of fatigue and was checked and found to have hypothyroidism and has been on medication ever since. Had been on levothyroxine 50 mcg daily for years and may have been on brand levoxyl as a teenager but not recently. Was taking this at the same time as her calcium and her OCP and also drinks coffee within 30 minutes. TSH was 2.59 in 7/15 and TPO ab was normal.  Increased her dose to 75 mcg daily and advised her to move the thyroid med away from the calcium and coffee to improve absorption and she has been taking at bedtime. TSH down to 1.36 in 1/16 and changed to Levoxyl and TSH 1.57 in 7/16. Down to 1.49 in 11/16 so increased dose to 88 mcg daily to try and get TSH < 1 to help with conception and became pregnant in 4/17 and I increased her dose to 1 tab on Mon-Fri and 2 tabs on Sat-Sun and TSH 1.08 in 5/17 so had her stay on this dose but then miscarried at the end of 5/17. Got pregnant again in 9/17 and miscarried in 10/17. TSH down to 0.46 in 1/18 so her current dose is still correct and not the reason for her miscarriages. I changed to 112 mcg tabs to take 1 tab daily to get a consistent dose in her system and allow for titration on the weekends when she becomes pregnant again and TSH down to 0.317 in 7/18 with losing 15 lbs so had her decrease to 6.5 tab/week. Became pregnant in 8/18 and increased her dose to 8.5 tabs/week and TSH 0.03 in 10/18 and decreased her dose to 8 tabs/week and later that month TSH up to 0.12 so decreased to 7.5 tabs/week and TSH up to 0.29 in 12/18 so kept dose the same. TSH down to 0.18 in 1/19 so decreased to 7 tabs/week and TSH 0.37 in 3/19 so kept dose the same. Delivered on 5/3/19 and asked her to decrease dose to 6 tabs/week after delivery but she forgot and TSH 0.057 in 6/19 so did this then and TSH 1.1 in 8/19 and 0.93 in 1/20 so kept dose the same.   She is currently 9 weeks pregnant and given TSH is at goal of 0.5-2.0 can just repeat her labs in the 2nd trimester so will have her do this through Dr. Max Rothman' office.  - check TSH and free T4 in 6 weeks and in the 3rd trimester and prior to next visit  - cont levoxyl 112 mcg 1 tab on Mon-Sat and none on Sun at bedtime        2. Obesity (BMI 30.0-34. 9): Had lost 12 lbs with starting phentermine in 7/16 but gained some back over Thanksgiving. Has stopped since being pregnant in 4/17 and then miscarried. Will stay off while still trying to conceive. Wt down 15 lbs from 1/18 to 7/18.  Up 21 lbs by 1/19 but is pregnant and delivered in 5/19 and up 12 lbs by 6/19. Down 19 lbs by 1/20 with keto diet and is currently 9 weeks pregnant so will readdress after delivery  - cont diet and exercise        Patient Instructions   1) TSH is a thyroid test.  Your level is 0.93 which is normal and at goal of 0.5-2.0. This test goes opposite of your thyroid dose and suggests your dose of levoxyl is perfect so I will keep your dose the same. 2) Plan on having Dr. Max Rothman repeat your labs in 6 weeks when you'll be in the 2nd trimester and e-mail me to let me know when you went so I can be in touch with the results and determine the timing of the next lab draw. Follow-up and Dispositions    · Return in about 9 months (around 10/22/2020). Copy sent to:  Dr. Micah Rothman    Lab follow up: 3/12/20  - TSH 0.46  - FT4 1.03  - gestational DM screen 79    Sent her the following message through [employer]:  I received your labs from Dr. Max Rothman that showed your TSH (thyroid test) is normal at 0.46 so your current dose of levoxyl is perfect. Have her repeat your labs one more time in the 3rd trimester to ensure everything says normal.  I also saw your gestational diabetes screen was normal at 79 so that's good news! Lab follow up: 5/14/20  - TSH 1.26  - FT4 0.91    Sent her the following message through [employer]:  TSH is a thyroid test.  Your level is 1.26 which is normal and at goal of 0.5-2.0.   This test goes opposite of your thyroid dose and suggests your dose of levoxyl is still perfect so stay on 1 tab 6 days a week until you deliver. Remind me again when your due date is. Plan on having your labs drawn 6 weeks after you deliver so we can see if you need a dose change at that time.

## 2020-01-22 NOTE — PATIENT INSTRUCTIONS
1) TSH is a thyroid test.  Your level is 0.93 which is normal and at goal of 0.5-2.0. This test goes opposite of your thyroid dose and suggests your dose of levoxyl is perfect so I will keep your dose the same. 2) Plan on having Dr. Cynthia Lewis repeat your labs in 6 weeks when you'll be in the 2nd trimester and e-mail me to let me know when you went so I can be in touch with the results and determine the timing of the next lab draw.

## 2020-01-30 LAB
ANTIBODY SCREEN, EXTERNAL: NEGATIVE
CHLAMYDIA, EXTERNAL: NEGATIVE
HBSAG, EXTERNAL: NEGATIVE
HIV, EXTERNAL: NON REACTIVE
RUBELLA, EXTERNAL: NORMAL
T. PALLIDUM, EXTERNAL: NON REACTIVE

## 2020-02-26 RX ORDER — LEVOTHYROXINE SODIUM 112 UG/1
TABLET ORAL
Qty: 90 TAB | Refills: 3 | Status: SHIPPED | OUTPATIENT
Start: 2020-02-26 | End: 2020-12-10 | Stop reason: SDUPTHER

## 2020-03-10 LAB — CREATININE, EXTERNAL: 0.51

## 2020-08-06 LAB — GRBS, EXTERNAL: NEGATIVE

## 2020-08-24 ENCOUNTER — HOSPITAL ENCOUNTER (OUTPATIENT)
Dept: PREADMISSION TESTING | Age: 40
Discharge: HOME | End: 2020-08-24
Payer: COMMERCIAL

## 2020-08-24 DIAGNOSIS — Z01.812 PRE-PROCEDURE LAB EXAM: ICD-10-CM

## 2020-08-24 PROCEDURE — 87635 SARS-COV-2 COVID-19 AMP PRB: CPT

## 2020-08-25 LAB — SARS-COV-2, COV2NT: NOT DETECTED

## 2020-08-27 ENCOUNTER — ANESTHESIA EVENT (OUTPATIENT)
Dept: LABOR AND DELIVERY | Age: 40
End: 2020-08-27
Payer: COMMERCIAL

## 2020-08-28 ENCOUNTER — ANESTHESIA (OUTPATIENT)
Dept: LABOR AND DELIVERY | Age: 40
End: 2020-08-28
Payer: COMMERCIAL

## 2020-08-28 ENCOUNTER — HOSPITAL ENCOUNTER (INPATIENT)
Age: 40
LOS: 2 days | Discharge: HOME | End: 2020-08-30
Attending: OBSTETRICS & GYNECOLOGY | Admitting: OBSTETRICS & GYNECOLOGY
Payer: COMMERCIAL

## 2020-08-28 DIAGNOSIS — Z98.891 S/P CESAREAN SECTION: Primary | ICD-10-CM

## 2020-08-28 LAB
ERYTHROCYTE [DISTWIDTH] IN BLOOD BY AUTOMATED COUNT: 13 % (ref 11.5–14.5)
HCT VFR BLD AUTO: 37.9 % (ref 35–47)
HGB BLD-MCNC: 12.5 G/DL (ref 11.5–16)
MCH RBC QN AUTO: 30.6 PG (ref 26–34)
MCHC RBC AUTO-ENTMCNC: 33 G/DL (ref 30–36.5)
MCV RBC AUTO: 92.9 FL (ref 80–99)
NRBC # BLD: 0 K/UL (ref 0–0.01)
NRBC BLD-RTO: 0 PER 100 WBC
PLATELET # BLD AUTO: 190 K/UL (ref 150–400)
PMV BLD AUTO: 11.6 FL (ref 8.9–12.9)
RBC # BLD AUTO: 4.08 M/UL (ref 3.8–5.2)
WBC # BLD AUTO: 9.3 K/UL (ref 3.6–11)

## 2020-08-28 PROCEDURE — 74011250636 HC RX REV CODE- 250/636: Performed by: OBSTETRICS & GYNECOLOGY

## 2020-08-28 PROCEDURE — 74011250636 HC RX REV CODE- 250/636

## 2020-08-28 PROCEDURE — 4A1HXCZ MONITORING OF PRODUCTS OF CONCEPTION, CARDIAC RATE, EXTERNAL APPROACH: ICD-10-PCS | Performed by: OBSTETRICS & GYNECOLOGY

## 2020-08-28 PROCEDURE — 75410000003 HC RECOV DEL/VAG/CSECN EA 0.5 HR: Performed by: OBSTETRICS & GYNECOLOGY

## 2020-08-28 PROCEDURE — 76010000392 HC C SECN EA ADDL 0.5 HR: Performed by: OBSTETRICS & GYNECOLOGY

## 2020-08-28 PROCEDURE — 74011000250 HC RX REV CODE- 250: Performed by: ANESTHESIOLOGY

## 2020-08-28 PROCEDURE — 86922 COMPATIBILITY TEST ANTIGLOB: CPT

## 2020-08-28 PROCEDURE — 74011250636 HC RX REV CODE- 250/636: Performed by: NURSE ANESTHETIST, CERTIFIED REGISTERED

## 2020-08-28 PROCEDURE — 74011250636 HC RX REV CODE- 250/636: Performed by: ANESTHESIOLOGY

## 2020-08-28 PROCEDURE — 65410000002 HC RM PRIVATE OB

## 2020-08-28 PROCEDURE — 76010000391 HC C SECN FIRST 1 HR: Performed by: OBSTETRICS & GYNECOLOGY

## 2020-08-28 PROCEDURE — 85027 COMPLETE CBC AUTOMATED: CPT

## 2020-08-28 PROCEDURE — 76060000078 HC EPIDURAL ANESTHESIA: Performed by: OBSTETRICS & GYNECOLOGY

## 2020-08-28 PROCEDURE — 74011000250 HC RX REV CODE- 250: Performed by: NURSE ANESTHETIST, CERTIFIED REGISTERED

## 2020-08-28 PROCEDURE — 86900 BLOOD TYPING SEROLOGIC ABO: CPT

## 2020-08-28 PROCEDURE — 86644 CMV ANTIBODY: CPT

## 2020-08-28 PROCEDURE — 77030007866 HC KT SPN ANES BBMI -B: Performed by: ANESTHESIOLOGY

## 2020-08-28 PROCEDURE — 86921 COMPATIBILITY TEST INCUBATE: CPT

## 2020-08-28 PROCEDURE — 36415 COLL VENOUS BLD VENIPUNCTURE: CPT

## 2020-08-28 RX ORDER — NALOXONE HYDROCHLORIDE 0.4 MG/ML
0.4 INJECTION, SOLUTION INTRAMUSCULAR; INTRAVENOUS; SUBCUTANEOUS AS NEEDED
Status: DISCONTINUED | OUTPATIENT
Start: 2020-08-28 | End: 2020-08-30 | Stop reason: HOSPADM

## 2020-08-28 RX ORDER — SODIUM CHLORIDE 0.9 % (FLUSH) 0.9 %
5-40 SYRINGE (ML) INJECTION EVERY 8 HOURS
Status: DISCONTINUED | OUTPATIENT
Start: 2020-08-28 | End: 2020-08-30 | Stop reason: HOSPADM

## 2020-08-28 RX ORDER — SODIUM CHLORIDE 0.9 % (FLUSH) 0.9 %
5-40 SYRINGE (ML) INJECTION AS NEEDED
Status: DISCONTINUED | OUTPATIENT
Start: 2020-08-28 | End: 2020-08-30 | Stop reason: HOSPADM

## 2020-08-28 RX ORDER — FENTANYL CITRATE 50 UG/ML
INJECTION, SOLUTION INTRAMUSCULAR; INTRAVENOUS AS NEEDED
Status: DISCONTINUED | OUTPATIENT
Start: 2020-08-28 | End: 2020-08-28 | Stop reason: HOSPADM

## 2020-08-28 RX ORDER — MORPHINE SULFATE 1 MG/ML
INJECTION, SOLUTION EPIDURAL; INTRATHECAL; INTRAVENOUS AS NEEDED
Status: DISCONTINUED | OUTPATIENT
Start: 2020-08-28 | End: 2020-08-28 | Stop reason: HOSPADM

## 2020-08-28 RX ORDER — ONDANSETRON 2 MG/ML
4 INJECTION INTRAMUSCULAR; INTRAVENOUS
Status: DISCONTINUED | OUTPATIENT
Start: 2020-08-28 | End: 2020-08-30 | Stop reason: HOSPADM

## 2020-08-28 RX ORDER — ONDANSETRON 2 MG/ML
4 INJECTION INTRAMUSCULAR; INTRAVENOUS
Status: ACTIVE | OUTPATIENT
Start: 2020-08-28 | End: 2020-08-29

## 2020-08-28 RX ORDER — EPHEDRINE SULFATE/0.9% NACL/PF 50 MG/5 ML
SYRINGE (ML) INTRAVENOUS AS NEEDED
Status: DISCONTINUED | OUTPATIENT
Start: 2020-08-28 | End: 2020-08-28 | Stop reason: HOSPADM

## 2020-08-28 RX ORDER — DIPHENHYDRAMINE HYDROCHLORIDE 50 MG/ML
12.5 INJECTION, SOLUTION INTRAMUSCULAR; INTRAVENOUS
Status: DISCONTINUED | OUTPATIENT
Start: 2020-08-28 | End: 2020-08-30 | Stop reason: HOSPADM

## 2020-08-28 RX ORDER — SWAB
1 SWAB, NON-MEDICATED MISCELLANEOUS DAILY
Status: DISCONTINUED | OUTPATIENT
Start: 2020-08-29 | End: 2020-08-30 | Stop reason: HOSPADM

## 2020-08-28 RX ORDER — MORPHINE SULFATE 2 MG/ML
2 INJECTION, SOLUTION INTRAMUSCULAR; INTRAVENOUS
Status: DISCONTINUED | OUTPATIENT
Start: 2020-08-28 | End: 2020-08-30 | Stop reason: HOSPADM

## 2020-08-28 RX ORDER — SODIUM CHLORIDE, SODIUM LACTATE, POTASSIUM CHLORIDE, CALCIUM CHLORIDE 600; 310; 30; 20 MG/100ML; MG/100ML; MG/100ML; MG/100ML
INJECTION, SOLUTION INTRAVENOUS
Status: DISCONTINUED | OUTPATIENT
Start: 2020-08-28 | End: 2020-08-28 | Stop reason: HOSPADM

## 2020-08-28 RX ORDER — OXYTOCIN/RINGER'S LACTATE 20/1000 ML
1 PLASTIC BAG, INJECTION (ML) INTRAVENOUS ONCE
Status: ACTIVE | OUTPATIENT
Start: 2020-08-28 | End: 2020-08-29

## 2020-08-28 RX ORDER — LEVOTHYROXINE SODIUM 112 UG/1
112 TABLET ORAL
Status: DISCONTINUED | OUTPATIENT
Start: 2020-08-28 | End: 2020-08-30 | Stop reason: HOSPADM

## 2020-08-28 RX ORDER — OXYTOCIN/RINGER'S LACTATE 20/1000 ML
125-1000 PLASTIC BAG, INJECTION (ML) INTRAVENOUS
Status: DISCONTINUED | OUTPATIENT
Start: 2020-08-28 | End: 2020-08-28 | Stop reason: HOSPADM

## 2020-08-28 RX ORDER — CEFAZOLIN SODIUM/WATER 2 G/20 ML
2 SYRINGE (ML) INTRAVENOUS ONCE
Status: COMPLETED | OUTPATIENT
Start: 2020-08-28 | End: 2020-08-28

## 2020-08-28 RX ORDER — KETOROLAC TROMETHAMINE 30 MG/ML
30 INJECTION, SOLUTION INTRAMUSCULAR; INTRAVENOUS
Status: ACTIVE | OUTPATIENT
Start: 2020-08-28 | End: 2020-08-29

## 2020-08-28 RX ORDER — MORPHINE SULFATE 10 MG/ML
6 INJECTION, SOLUTION INTRAMUSCULAR; INTRAVENOUS
Status: ACTIVE | OUTPATIENT
Start: 2020-08-28 | End: 2020-08-29

## 2020-08-28 RX ORDER — BUPIVACAINE HYDROCHLORIDE 7.5 MG/ML
INJECTION, SOLUTION EPIDURAL; RETROBULBAR
Status: COMPLETED | OUTPATIENT
Start: 2020-08-28 | End: 2020-08-28

## 2020-08-28 RX ORDER — DOCUSATE SODIUM 100 MG/1
100 CAPSULE, LIQUID FILLED ORAL 2 TIMES DAILY
Status: DISCONTINUED | OUTPATIENT
Start: 2020-08-28 | End: 2020-08-30 | Stop reason: HOSPADM

## 2020-08-28 RX ORDER — MORPHINE SULFATE 10 MG/ML
10 INJECTION, SOLUTION INTRAMUSCULAR; INTRAVENOUS
Status: ACTIVE | OUTPATIENT
Start: 2020-08-28 | End: 2020-08-29

## 2020-08-28 RX ORDER — MORPHINE SULFATE 0.5 MG/ML
INJECTION, SOLUTION EPIDURAL; INTRATHECAL; INTRAVENOUS
Status: COMPLETED | OUTPATIENT
Start: 2020-08-28 | End: 2020-08-28

## 2020-08-28 RX ORDER — ONDANSETRON 2 MG/ML
INJECTION INTRAMUSCULAR; INTRAVENOUS AS NEEDED
Status: DISCONTINUED | OUTPATIENT
Start: 2020-08-28 | End: 2020-08-28 | Stop reason: HOSPADM

## 2020-08-28 RX ORDER — KETOROLAC TROMETHAMINE 30 MG/ML
INJECTION, SOLUTION INTRAMUSCULAR; INTRAVENOUS AS NEEDED
Status: DISCONTINUED | OUTPATIENT
Start: 2020-08-28 | End: 2020-08-28 | Stop reason: HOSPADM

## 2020-08-28 RX ORDER — SIMETHICONE 80 MG
80 TABLET,CHEWABLE ORAL AS NEEDED
Status: DISCONTINUED | OUTPATIENT
Start: 2020-08-28 | End: 2020-08-29 | Stop reason: SDUPTHER

## 2020-08-28 RX ORDER — DEXAMETHASONE SODIUM PHOSPHATE 4 MG/ML
INJECTION, SOLUTION INTRA-ARTICULAR; INTRALESIONAL; INTRAMUSCULAR; INTRAVENOUS; SOFT TISSUE AS NEEDED
Status: DISCONTINUED | OUTPATIENT
Start: 2020-08-28 | End: 2020-08-28 | Stop reason: HOSPADM

## 2020-08-28 RX ORDER — BUPIVACAINE HYDROCHLORIDE 7.5 MG/ML
INJECTION, SOLUTION EPIDURAL; RETROBULBAR AS NEEDED
Status: DISCONTINUED | OUTPATIENT
Start: 2020-08-28 | End: 2020-08-28 | Stop reason: HOSPADM

## 2020-08-28 RX ORDER — MIDAZOLAM HYDROCHLORIDE 1 MG/ML
INJECTION, SOLUTION INTRAMUSCULAR; INTRAVENOUS AS NEEDED
Status: DISCONTINUED | OUTPATIENT
Start: 2020-08-28 | End: 2020-08-28 | Stop reason: HOSPADM

## 2020-08-28 RX ORDER — OXYCODONE AND ACETAMINOPHEN 5; 325 MG/1; MG/1
1 TABLET ORAL
Status: DISCONTINUED | OUTPATIENT
Start: 2020-08-28 | End: 2020-08-30 | Stop reason: HOSPADM

## 2020-08-28 RX ORDER — SODIUM CHLORIDE, SODIUM LACTATE, POTASSIUM CHLORIDE, CALCIUM CHLORIDE 600; 310; 30; 20 MG/100ML; MG/100ML; MG/100ML; MG/100ML
125 INJECTION, SOLUTION INTRAVENOUS CONTINUOUS
Status: DISCONTINUED | OUTPATIENT
Start: 2020-08-28 | End: 2020-08-30 | Stop reason: HOSPADM

## 2020-08-28 RX ORDER — IBUPROFEN 400 MG/1
800 TABLET ORAL EVERY 8 HOURS
Status: DISCONTINUED | OUTPATIENT
Start: 2020-08-30 | End: 2020-08-29

## 2020-08-28 RX ORDER — KETOROLAC TROMETHAMINE 30 MG/ML
30 INJECTION, SOLUTION INTRAMUSCULAR; INTRAVENOUS
Status: DISPENSED | OUTPATIENT
Start: 2020-08-28 | End: 2020-08-29

## 2020-08-28 RX ADMIN — Medication 10 MG: at 11:24

## 2020-08-28 RX ADMIN — ONDANSETRON 4 MG: 2 INJECTION INTRAMUSCULAR; INTRAVENOUS at 18:22

## 2020-08-28 RX ADMIN — FENTANYL CITRATE 25 MCG: 50 INJECTION, SOLUTION INTRAMUSCULAR; INTRAVENOUS at 12:28

## 2020-08-28 RX ADMIN — ONDANSETRON HYDROCHLORIDE 4 MG: 2 INJECTION, SOLUTION INTRAMUSCULAR; INTRAVENOUS at 11:30

## 2020-08-28 RX ADMIN — FENTANYL CITRATE 25 MCG: 50 INJECTION, SOLUTION INTRAMUSCULAR; INTRAVENOUS at 11:52

## 2020-08-28 RX ADMIN — PHENYLEPHRINE HYDROCHLORIDE 80 MCG: 10 INJECTION INTRAVENOUS at 11:39

## 2020-08-28 RX ADMIN — SODIUM CHLORIDE, POTASSIUM CHLORIDE, SODIUM LACTATE AND CALCIUM CHLORIDE: 600; 310; 30; 20 INJECTION, SOLUTION INTRAVENOUS at 11:18

## 2020-08-28 RX ADMIN — SODIUM CHLORIDE, POTASSIUM CHLORIDE, SODIUM LACTATE AND CALCIUM CHLORIDE: 600; 310; 30; 20 INJECTION, SOLUTION INTRAVENOUS at 10:52

## 2020-08-28 RX ADMIN — FENTANYL CITRATE 25 MCG: 50 INJECTION, SOLUTION INTRAMUSCULAR; INTRAVENOUS at 11:58

## 2020-08-28 RX ADMIN — MIDAZOLAM 2 MG: 1 INJECTION INTRAMUSCULAR; INTRAVENOUS at 12:06

## 2020-08-28 RX ADMIN — ONDANSETRON 4 MG: 2 INJECTION INTRAMUSCULAR; INTRAVENOUS at 13:23

## 2020-08-28 RX ADMIN — FENTANYL CITRATE 25 MCG: 50 INJECTION, SOLUTION INTRAMUSCULAR; INTRAVENOUS at 12:31

## 2020-08-28 RX ADMIN — PHENYLEPHRINE HYDROCHLORIDE 80 MCG: 10 INJECTION INTRAVENOUS at 11:30

## 2020-08-28 RX ADMIN — FENTANYL CITRATE 25 MCG: 50 INJECTION, SOLUTION INTRAMUSCULAR; INTRAVENOUS at 12:02

## 2020-08-28 RX ADMIN — PHENYLEPHRINE HYDROCHLORIDE 80 MCG: 10 INJECTION INTRAVENOUS at 11:18

## 2020-08-28 RX ADMIN — FENTANYL CITRATE 25 MCG: 50 INJECTION, SOLUTION INTRAMUSCULAR; INTRAVENOUS at 12:25

## 2020-08-28 RX ADMIN — FENTANYL CITRATE 25 MCG: 50 INJECTION, SOLUTION INTRAMUSCULAR; INTRAVENOUS at 11:56

## 2020-08-28 RX ADMIN — MORPHINE SULFATE 200 MCG: 1 INJECTION, SOLUTION EPIDURAL; INTRATHECAL; INTRAVENOUS at 11:16

## 2020-08-28 RX ADMIN — Medication 2 G: at 11:20

## 2020-08-28 RX ADMIN — BUPIVACAINE HYDROCHLORIDE 11 MG: 7.5 INJECTION, SOLUTION EPIDURAL; RETROBULBAR at 11:16

## 2020-08-28 RX ADMIN — FENTANYL CITRATE 25 MCG: 50 INJECTION, SOLUTION INTRAMUSCULAR; INTRAVENOUS at 12:32

## 2020-08-28 RX ADMIN — BUPIVACAINE HYDROCHLORIDE 11 MG: 7.5 INJECTION, SOLUTION EPIDURAL; RETROBULBAR at 11:18

## 2020-08-28 RX ADMIN — DEXAMETHASONE SODIUM PHOSPHATE 8 MG: 4 INJECTION, SOLUTION INTRAMUSCULAR; INTRAVENOUS at 11:52

## 2020-08-28 RX ADMIN — PHENYLEPHRINE HYDROCHLORIDE 80 MCG: 10 INJECTION INTRAVENOUS at 11:21

## 2020-08-28 RX ADMIN — Medication 10 MG: at 11:18

## 2020-08-28 RX ADMIN — SODIUM CHLORIDE, POTASSIUM CHLORIDE, SODIUM LACTATE AND CALCIUM CHLORIDE: 600; 310; 30; 20 INJECTION, SOLUTION INTRAVENOUS at 11:39

## 2020-08-28 RX ADMIN — KETOROLAC TROMETHAMINE 30 MG: 30 INJECTION, SOLUTION INTRAMUSCULAR; INTRAVENOUS at 12:25

## 2020-08-28 RX ADMIN — Medication 10 MG: at 11:25

## 2020-08-28 RX ADMIN — MORPHINE SULFATE 0.25 MG: 0.5 INJECTION, SOLUTION EPIDURAL; INTRATHECAL; INTRAVENOUS at 11:18

## 2020-08-28 RX ADMIN — Medication 10 MG: at 11:21

## 2020-08-28 NOTE — PROGRESS NOTES
Called to bedside due to dressing \"saturated\" with blood on right lateral aspect. Dressing removed by me at bedside. Small ooze noted from right aspect of incision in subcuticular area. Sterile gauze applied to incision and pressure held for 2-3 minutes. Gauze removed and incision again inspected. Hemostasis was excellent. New aquacel dressing applied to incision.       Marisol Villafana MD

## 2020-08-28 NOTE — OP NOTES
Operative Note    Name: Osmany Klein   Medical Record Number: [id number]   YOB: 1980  Today's Date: 2020      Pre-operative Diagnosis: 1. H/o c/s x 1 desiring repeat c/s  2. Hypothyroidism  3. AMA    Post-operative Diagnosis: same but delivered    Operation: low transverse  section Procedure(s):   SECTION    Surgeon(s):  Rodriguez Geiger MD    Anesthesia: Spinal    Prophylactic Antibiotics: Ancef  DVT Prophylaxis: Sequential Compression Devices         Fetal Description: baez     Birth Information:   Information for the patient's :  Kalpesh Mijares [[id number]]   Delivery of a   male infant on 2020 at 11:46 AM. Apgars were 9  and 9 . Umbilical Cord: 3 Vessels     Umbilical Cord Events: None     Placenta: Expressed removal with Normal appearance. Amniotic Fluid Volume:        Amniotic Fluid Description:             Placenta:  expressed Expressed    Estimated Blood Loss (ml):    QBL pending    Specimens: None           Complications:  none    Procedure Detail:      After proper patient identification and consent, the patient was taken to the operating room, where spinal anesthesia was administered and found to be adequate. Hendrickson catheter had been placed using sterile technique. The patient was prepped and draped in the normal sterile fashion. The abdomen was entered using the Pfannenstiel technique. The peritoneum was entered sharply well superior to the bladder without any apparent injury. The bladder flap was created. A low transverse uterine incision was made with the scalpel  and extended with blunt finger dissection. Amniotomy was performed and the fluid was small amount clear. The babys head was then delivered atraumatically. The cord was clamped and cut and the baby was handed off to Nursing staff in attendance. Placenta was then removed from the uterus.  The uterus was curettaged with a moist lap pad and cleared of all clots and debris. The uterine incision was closed with 0 monocryl, double layer  in running locking fashion with good hemostasis assured. Good hemostasis was again reassured. The rectus muscles were reapproximated with 2-0 vicryl after reapproximation of peritoneum. The fascia was closed with 0 Vicryl in a running fashion. The subcuticular tissue was irrigated with sterile saline and was suctioned/blotted dry. Hemostasis was achieved with a bovie. The subcuticular tissue was reapproximated with 2-0 plain gut. Good hemostasis was assured. The skin was closed with an Ensorb closure. 20 mL of 1% lidocaine with epinephrine was injected subcuticularly for improved pain control. A skin dressing was applied. The patient tolerated the procedure well. Sponge, lap, and needle counts were correct times three and the patient and baby were taken to recovery/postpartum room in stable condition.     Brennen Nolen MD  August 28, 2020  12:37 PM

## 2020-08-28 NOTE — ROUTINE PROCESS
0805: patient arrived ambulatory from home for scheduled C/S. Reports positive fetal movement; denies leaking of fluid or bleeding.

## 2020-08-28 NOTE — ANESTHESIA PROCEDURE NOTES
Spinal Block    Performed by: Chidi Humphreys MD  Authorized by: Jeimy Angel MD     Pre-procedure:   Indications: primary anesthetic  Preanesthetic Checklist: patient identified, risks and benefits discussed, anesthesia consent, site marked, patient being monitored and timeout performed      Spinal Block:   Patient Position:  Seated    Prep: Betadine      Location:  L3-4  Technique:  Single shot        Needle:   Needle Type:  Pencil-tip  Needle Gauge:  25 G  Attempts:  1      Events: CSF confirmed, no blood with aspiration and no paresthesia        Assessment:  Insertion:  Uncomplicated  Patient tolerance:  Patient tolerated the procedure well with no immediate complications

## 2020-08-28 NOTE — ANESTHESIA POSTPROCEDURE EVALUATION
Post-Anesthesia Evaluation and Assessment    Patient: El Dockery MRN: [id number]  SSN: [id number]    YOB: 1980  Age: 45 y.o. Sex: female      I have evaluated the patient and they are stable and ready for discharge from the PACU. Cardiovascular Function/Vital Signs  Visit Vitals  /81   Pulse 72   Temp 36.5 °C (97.7 °F)   Resp 18   Ht 5' 4\" (1.626 m)   Wt 103 kg (227 lb)   SpO2 100%   Breastfeeding No   BMI 38.96 kg/m²       Patient is status post Spinal anesthesia for Procedure(s) with comments:   SECTION - EDC 20. Nausea/Vomiting: None    Postoperative hydration reviewed and adequate. Pain:  Pain Scale 1: Numeric (0 - 10) (20 0841)  Pain Intensity 1: 0 (20 0841)   Managed    Neurological Status: At baseline    Mental Status, Level of Consciousness: Alert and  oriented to person, place, and time    Pulmonary Status:   O2 Device: Room air (20 1236)   Adequate oxygenation and airway patent    Complications related to anesthesia: None    Post-anesthesia assessment completed. No concerns    Signed By: Landon Hannon MD     2020              Procedure(s):   SECTION.     spinal    <[email or web address]>    INITIAL Post-op Vital signs:   Vitals Value Taken Time   /81 2020 12:36 PM   Temp 36.5 °C (97.7 °F) 2020 12:36 PM   Pulse 72 2020 12:36 PM   Resp 18 2020 12:36 PM   SpO2 100 % 2020 12:36 PM

## 2020-08-28 NOTE — L&D DELIVERY NOTE
Delivery Summary  Patient: Santiago Smiling             Circumcision:   desires  Additional Delivery Comments - Uncomplicated repeat  section was performed. Baby was vigorous at birth and delayed cord clamping took place. Please see operative note for details.       Information for the patient's :  Sana Klein [[id number]]       Labor Events:    Labor: No    Steroids: None   Cervical Ripening Date/Time:       Cervical Ripening Type: None   Antibiotics During Labor: No   Rupture Identifier:      Rupture Date/Time:       Rupture Type: AROM   Amniotic Fluid Volume:      Amniotic Fluid Description:      Amniotic Fluid Odor:      Induction: None       Induction Date/Time:        Indications for Induction:      Augmentation: None   Augmentation Date/Time:      Indications for Augmentation:     Labor complications: None       Additional complications:        Delivery Events:  Indications For Episiotomy:     Episiotomy: None   Perineal Laceration(s): None   Repaired:     Periurethral Laceration Location:      Repaired:     Labial Laceration Location:     Repaired:     Sulcal Laceration Location:     Repaired:     Vaginal Laceration Location:     Repaired:     Cervical Laceration Location:     Repaired:     Repair Suture: None   Number of Repair Packets:     Estimated Blood Loss (ml):  ml   Quantitative Blood Loss (ml)                Delivery Date: 2020    Delivery Time: 11:46 AM  Delivery Type: , Low Transverse  Sex:  Male    Gestational Age: 41w4d   Delivery Clinician:  Comfort Lovell  Living Status: Living   Delivery Location: L&D            APGARS  One minute Five minutes Ten minutes   Skin color: 1   1        Heart rate: 2   2        Grimace: 2   2        Muscle tone: 2   2        Breathin   2        Totals: 9   9            Presentation: Vertex    Position:        Resuscitation Method:  Tactile Stimulation;Suctioning-bulb     Meconium Stained: None      Cord Information: 3 Vessels  Complications: None  Cord around:    Delayed cord clamping? Yes  Cord clamped date/time:2020 11:47 AM  Disposition of Cord Blood: Lab    Blood Gases Sent?: No    Placenta:  Date/Time: 2020 11:47 AM  Removal: Expressed      Appearance: Normal     [place] Measurements:  Birth Weight:        Birth Length:        Head Circumference:        Chest Circumference:       Abdominal Girth:       Other Providers:   WESTON ALVAREZ;DEVYN PEARCE;JAMI BLEDSOE, Obstetrician;Primary Nurse;Primary  Nurse;Nicu Nurse;Neonatologist;Anesthesiologist;Crna;Nurse Practitioner           Cord pH:  none    Episiotomy: None   Laceration(s): None     Estimated Blood Loss (ml): QBL pending    Labor Events  Method: None      Augmentation: None   Cervical Ripening:     None        Hospital Problems  Date Reviewed: 2020          Codes Class Noted POA    History of  section ICD-10-CM: Z98.891  ICD-9-CM: V45.89  2020 Unknown              Operative Vaginal Delivery - none    Group B Strep:   Lab Results   Component Value Date/Time    GrBStrep, External negative 2020     Information for the patient's :  Bryan Canales [[id number]]   No results found for: ABORH, PCTABR, PCTDIG, BILI, ABORHEXT, ABORH     No results found for: APH, APCO2, APO2, AHCO3, ABEC, ABDC, O2ST, EPHV, PCO2V, PO2V, HCO3V, EBEV, EBDV, SITE, RSCOM

## 2020-08-28 NOTE — ANESTHESIA PREPROCEDURE EVALUATION
Relevant Problems   No relevant active problems       Anesthetic History   No history of anesthetic complications            Review of Systems / Medical History  Patient summary reviewed, nursing notes reviewed and pertinent labs reviewed    Pulmonary  Within defined limits                 Neuro/Psych   Within defined limits           Cardiovascular  Within defined limits                     GI/Hepatic/Renal  Within defined limits              Endo/Other      Hypothyroidism  Obesity     Other Findings              Physical Exam    Airway  Mallampati: II  TM Distance: > 6 cm  Neck ROM: normal range of motion   Mouth opening: Normal     Cardiovascular  Regular rate and rhythm,  S1 and S2 normal,  no murmur, click, rub, or gallop             Dental  No notable dental hx       Pulmonary  Breath sounds clear to auscultation               Abdominal  GI exam deferred       Other Findings            Anesthetic Plan    ASA: 2  Anesthesia type: spinal      Post-op pain plan if not by surgeon: intrathecal opiates      Anesthetic plan and risks discussed with: Patient

## 2020-08-28 NOTE — H&P
History & Physical    Name: Vern Rachel MRN: [id number]  SSN: [id number]    YOB: 1980  Age: 45 y.o. Sex: female      Subjective:     Estimated Date of Delivery: 20  OB History    Para Term  AB Living   2 1 1     1   SAB TAB Ectopic Molar Multiple Live Births           0 1      # Outcome Date GA Lbr Jose M/2nd Weight Sex Delivery Anes PTL Lv   2 Current            1 Term 19 40w3d  3.25 kg F CS-LTranv Spinal N ALFRED       Ms. Zohaib Gonzalez is a  admitted with pregnancy at 40w2d for  section due to planned repeat c/s. Prenatal course was c/b hypothyroidism, well controlled on levoxyl, and AMA with normal fetal scan. She is Rh neg and received rhogam at 28 wks. She notes active FM today. Denies contractions or other complaints. Please see prenatal records for details. Past Medical History:   Diagnosis Date    Unspecified hypothyroidism     Taking Levoxyl     Past Surgical History:   Procedure Laterality Date    HX  SECTION      HX WISDOM TEETH EXTRACTION       Social History     Occupational History    Not on file   Tobacco Use    Smoking status: Never Smoker    Smokeless tobacco: Never Used   Substance and Sexual Activity    Alcohol use: Not Currently     Alcohol/week: 0.0 standard drinks     Comment: twice a week may have 2 beers    Drug use: No    Sexual activity: Not on file     Family History   Problem Relation Age of Onset    Thyroid Disease Father         hyperthyroidism    Diabetes Mother         Type 2 DM       Allergies   Allergen Reactions    Erythromycin Rash     Prior to Admission medications    Medication Sig Start Date End Date Taking? Authorizing Provider   LEVOXYL 112 mcg tablet Take 1 tab on Mon-Sat and skip . BRAND MEDICALLY NECESSARY 20  Yes Ewelina Sr MD   aspirin 81 mg chewable tablet Take 81 mg by mouth daily. Yes Provider, Historical   PNV HD.41/WHJMFBV FUM/FOLIC AC (PRENATAL PO) Take  by mouth daily. Yes Provider, Historical   BIOTIN PO Take  by mouth. Provider, Historical        Review of Systems   As noted in HPI otherwise negative    Objective:     Vitals:  Vitals:    20 0830 20 0833   BP: 125/63    Pulse: 64    Resp: 16    Temp: 98.6 °F (37 °C)    Weight:  103 kg (227 lb)   Height:  5' 4\" (1.626 m)        Physical Exam:  Physical Exam  Cervical Exam: deferred  Abd: Gravid, EFW 8#  Ext: no edema      Category 1 fetal heart tracing  Contractions are rare    Prenatal Labs:   Lab Results   Component Value Date/Time    ABO/Rh(D) O NEGATIVE 2019 03:59 AM    Rubella, External immune 2020    GrBStrep, External negative 2020    HBsAg, External negative 2020    HIV, External non reactive 2020    Gonorrhea, External Negative 10/04/2018    Chlamydia, External negative 2020    ABO,Rh O NEGATIVE 10/04/2018         Impression/Plan:     Active Problems:    History of  section (2020)      at 40 2/7 here for scheduled repeat c/s    Plan:  Admit for  section. Group B Strep was negative. Discussed the risks of surgery including the risks of bleeding, infection, deep vein thrombosis, and surgical injuries to internal organs including but not limited to the bowels, bladder, rectum, and female reproductive organs. The patient understands the risks; any and all questions were answered to the patient's satisfaction.     Aga Ellis MD

## 2020-08-28 NOTE — ROUTINE PROCESS
Spoke with Dr Pk Aragon regarding increased blood stain on dressing. Marked and timed will watch and advise Dr. Pk Aragon if increases. 1800 - no increase in stain on dressing

## 2020-08-28 NOTE — PROGRESS NOTES
1801 [facility] care of patient from Carrie Watson, RN    1519 Patient ambulatory to OR 2 for scheduled, repeat c/s.    1146 liveborn male delievered via LTCS by Dr Darrell Palafox    [id number] Dressing saturated; notifed Dr. Darrell Palafox    [phone] Dr Darrell Palafox at bedside evaluating wound. Dressing changed. 150 TRANSFER - OUT REPORT:    Verbal report given to A. [place], RN(name) on [employer]  being transferred to MIU(unit) for routine progression of care       Report consisted of patients Situation, Background, Assessment and   Recommendations(SBAR). Information from the following report(s) SBAR, OR Summary, Procedure Summary, Intake/Output, Accordion and Recent Results was reviewed with the receiving nurse. Lines:   Peripheral IV 08/28/20 Anterior;Right Hand (Active)        Opportunity for questions and clarification was provided.       Patient transported with:   Registered Nurse

## 2020-08-28 NOTE — ROUTINE PROCESS
TRANSFER - IN REPORT: 
 
Verbal report received from Mee Ma RN(name) on [facility]  being received from L & D(unit) for routine progression of care Report consisted of patients Situation, Background, Assessment and  
Recommendations(SBAR). Information from the following report(s) SBAR was reviewed with the receiving nurse. Opportunity for questions and clarification was provided. Assessment completed upon patients arrival to unit and care assumed.

## 2020-08-29 LAB
BASOPHILS # BLD: 0 K/UL (ref 0–0.1)
BASOPHILS NFR BLD: 0 % (ref 0–1)
DIFFERENTIAL METHOD BLD: ABNORMAL
EOSINOPHIL # BLD: 0 K/UL (ref 0–0.4)
EOSINOPHIL NFR BLD: 0 % (ref 0–7)
ERYTHROCYTE [DISTWIDTH] IN BLOOD BY AUTOMATED COUNT: 13.1 % (ref 11.5–14.5)
HCT VFR BLD AUTO: 33.2 % (ref 35–47)
HGB BLD-MCNC: 10.9 G/DL (ref 11.5–16)
IMM GRANULOCYTES # BLD AUTO: 0.1 K/UL (ref 0–0.04)
IMM GRANULOCYTES NFR BLD AUTO: 1 % (ref 0–0.5)
LYMPHOCYTES # BLD: 1.8 K/UL (ref 0.8–3.5)
LYMPHOCYTES NFR BLD: 10 % (ref 12–49)
MCH RBC QN AUTO: 30.7 PG (ref 26–34)
MCHC RBC AUTO-ENTMCNC: 32.8 G/DL (ref 30–36.5)
MCV RBC AUTO: 93.5 FL (ref 80–99)
MONOCYTES # BLD: 0.9 K/UL (ref 0–1)
MONOCYTES NFR BLD: 5 % (ref 5–13)
NEUTS SEG # BLD: 14.6 K/UL (ref 1.8–8)
NEUTS SEG NFR BLD: 84 % (ref 32–75)
NRBC # BLD: 0 K/UL (ref 0–0.01)
NRBC BLD-RTO: 0 PER 100 WBC
PLATELET # BLD AUTO: 192 K/UL (ref 150–400)
PMV BLD AUTO: 11.7 FL (ref 8.9–12.9)
RBC # BLD AUTO: 3.55 M/UL (ref 3.8–5.2)
WBC # BLD AUTO: 17.5 K/UL (ref 3.6–11)

## 2020-08-29 PROCEDURE — 74011250637 HC RX REV CODE- 250/637: Performed by: OBSTETRICS & GYNECOLOGY

## 2020-08-29 PROCEDURE — 36415 COLL VENOUS BLD VENIPUNCTURE: CPT

## 2020-08-29 PROCEDURE — 74011250636 HC RX REV CODE- 250/636: Performed by: OBSTETRICS & GYNECOLOGY

## 2020-08-29 PROCEDURE — 85461 HEMOGLOBIN FETAL: CPT

## 2020-08-29 PROCEDURE — 86900 BLOOD TYPING SEROLOGIC ABO: CPT

## 2020-08-29 PROCEDURE — 85025 COMPLETE CBC W/AUTO DIFF WBC: CPT

## 2020-08-29 PROCEDURE — 65410000002 HC RM PRIVATE OB

## 2020-08-29 PROCEDURE — 74011250637 HC RX REV CODE- 250/637: Performed by: ADVANCED PRACTICE MIDWIFE

## 2020-08-29 PROCEDURE — 74011250636 HC RX REV CODE- 250/636: Performed by: ANESTHESIOLOGY

## 2020-08-29 RX ORDER — ACETAMINOPHEN 325 MG/1
650 TABLET ORAL
Status: DISCONTINUED | OUTPATIENT
Start: 2020-08-29 | End: 2020-08-30 | Stop reason: HOSPADM

## 2020-08-29 RX ORDER — IBUPROFEN 400 MG/1
800 TABLET ORAL EVERY 8 HOURS
Status: DISCONTINUED | OUTPATIENT
Start: 2020-08-29 | End: 2020-08-30 | Stop reason: HOSPADM

## 2020-08-29 RX ORDER — SIMETHICONE 80 MG
80 TABLET,CHEWABLE ORAL
Status: DISCONTINUED | OUTPATIENT
Start: 2020-08-29 | End: 2020-08-30 | Stop reason: HOSPADM

## 2020-08-29 RX ADMIN — LEVOTHYROXINE SODIUM 112 MCG: 0.11 TABLET ORAL at 22:00

## 2020-08-29 RX ADMIN — DOCUSATE SODIUM 100 MG: 100 CAPSULE, LIQUID FILLED ORAL at 08:54

## 2020-08-29 RX ADMIN — IBUPROFEN 800 MG: 400 TABLET, FILM COATED ORAL at 14:58

## 2020-08-29 RX ADMIN — KETOROLAC TROMETHAMINE 30 MG: 30 INJECTION, SOLUTION INTRAMUSCULAR at 08:54

## 2020-08-29 RX ADMIN — ACETAMINOPHEN 650 MG: 325 TABLET ORAL at 14:58

## 2020-08-29 RX ADMIN — SIMETHICONE 80 MG: 80 TABLET, CHEWABLE ORAL at 17:01

## 2020-08-29 RX ADMIN — IBUPROFEN 800 MG: 400 TABLET, FILM COATED ORAL at 22:45

## 2020-08-29 RX ADMIN — ACETAMINOPHEN 650 MG: 325 TABLET ORAL at 19:42

## 2020-08-29 RX ADMIN — KETOROLAC TROMETHAMINE 30 MG: 30 INJECTION, SOLUTION INTRAMUSCULAR at 02:35

## 2020-08-29 NOTE — PROGRESS NOTES
Post-Operative  Day 1    Maribel Abbott     Assessment:   Post-Op day 1, stable  Boy, planning circ  Rh neg, rhogam if needed    Plan:   1. Routine post-operative care   2. The risks and benefits of the circumcision  procedure and anesthesia including: bleeding, infection, variability of cosmetic results were discussed at length with the mother. She is aware that future repeat procedures may be necessary. She gives informed consent to proceed as noted and her questions are answered. Plan for circ likely POD2. Information for the patient's :  Ana Laura Gabrielite [[id number]]   , Low Transverse    Patient doing well without significant complaint. Nausea and vomiting resolved, tolerating liquids, no flatus, alejandro in place. Vitals:  Visit Vitals  BP (!) 87/52 (BP 1 Location: Right arm, BP Patient Position: At rest)   Pulse (!) 59   Temp 97.8 °F (36.6 °C)   Resp 16   Ht 5' 4\" (1.626 m)   Wt 103 kg (227 lb)   SpO2 97%   Breastfeeding Unknown   BMI 38.96 kg/m²     Temp (24hrs), Av °F (36.7 °C), Min:97.4 °F (36.3 °C), Max:98.7 °F (37.1 °C)      Last 24hr Input/Output:    Intake/Output Summary (Last 24 hours) at 2020 0731  Last data filed at 2020 0300  Gross per 24 hour   Intake 1500 ml   Output 3000 ml   Net -1500 ml          Exam:        Patient without distress. Lungs clear. Abdomen, bowel sounds present, soft, expected tenderness, fundus firm Wound dressing intact, non-expanding spot of dried blood noted at left lateral aspect of dressing     Perineum normal lochia noted               Lower extremities are negative for swelling, cords or tenderness.     Labs:   Lab Results   Component Value Date/Time    WBC 17.5 (H) 2020 06:12 AM    WBC 9.3 2020 09:02 AM    WBC 17.7 (H) 2019 03:59 AM    WBC 11.3 (H) 2019 09:35 AM    HGB 10.9 (L) 2020 06:12 AM    HGB 12.5 2020 09:02 AM    HGB 11.2 (L) 2019 03:59 AM    HGB 12.7 2019 09:35 AM HCT 33.2 (L) 08/29/2020 06:12 AM    HCT 37.9 08/28/2020 09:02 AM    HCT 34.4 (L) 05/04/2019 03:59 AM    HCT 38.8 05/02/2019 09:35 AM    PLATELET 043 66/55/4227 06:12 AM    PLATELET 755 60/44/9249 09:02 AM    PLATELET 561 26/64/4163 03:59 AM    PLATELET 103 08/58/3303 09:35 AM       Recent Results (from the past 24 hour(s))   TYPE & SCREEN    Collection Time: 08/28/20  9:02 AM   Result Value Ref Range    Crossmatch Expiration 08/31/2020     ABO/Rh(D) O NEGATIVE     Antibody screen NEG     Comment PREVIOUSLY IDENTIFIED ANTI D PASSIVELY ACQUIRED     Unit number [id number]     Blood component type [facility]     Unit division 00     Status of unit ALLOCATED     Crossmatch result Compatible    CBC W/O DIFF    Collection Time: 08/28/20  9:02 AM   Result Value Ref Range    WBC 9.3 3.6 - 11.0 K/uL    RBC 4.08 3.80 - 5.20 M/uL    HGB 12.5 11.5 - 16.0 g/dL    HCT 37.9 35.0 - 47.0 %    MCV 92.9 80.0 - 99.0 FL    MCH 30.6 26.0 - 34.0 PG    MCHC 33.0 30.0 - 36.5 g/dL    RDW 13.0 11.5 - 14.5 %    PLATELET 681 400 - 865 K/uL    MPV 11.6 8.9 - 12.9 FL    NRBC 0.0 0  WBC    ABSOLUTE NRBC 0.00 0.00 - 0.01 K/uL   CBC WITH AUTOMATED DIFF    Collection Time: 08/29/20  6:12 AM   Result Value Ref Range    WBC 17.5 (H) 3.6 - 11.0 K/uL    RBC 3.55 (L) 3.80 - 5.20 M/uL    HGB 10.9 (L) 11.5 - 16.0 g/dL    HCT 33.2 (L) 35.0 - 47.0 %    MCV 93.5 80.0 - 99.0 FL    MCH 30.7 26.0 - 34.0 PG    MCHC 32.8 30.0 - 36.5 g/dL    RDW 13.1 11.5 - 14.5 %    PLATELET 609 436 - 222 K/uL    MPV 11.7 8.9 - 12.9 FL    NRBC 0.0 0  WBC    ABSOLUTE NRBC 0.00 0.00 - 0.01 K/uL    NEUTROPHILS 84 (H) 32 - 75 %    LYMPHOCYTES 10 (L) 12 - 49 %    MONOCYTES 5 5 - 13 %    EOSINOPHILS 0 0 - 7 %    BASOPHILS 0 0 - 1 %    IMMATURE GRANULOCYTES 1 (H) 0.0 - 0.5 %    ABS. NEUTROPHILS 14.6 (H) 1.8 - 8.0 K/UL    ABS. LYMPHOCYTES 1.8 0.8 - 3.5 K/UL    ABS. MONOCYTES 0.9 0.0 - 1.0 K/UL    ABS. EOSINOPHILS 0.0 0.0 - 0.4 K/UL    ABS. BASOPHILS 0.0 0.0 - 0.1 K/UL    ABS. IMM. GRANS. 0.1 (H) 0.00 - 0.04 K/UL    DF AUTOMATED

## 2020-08-29 NOTE — ROUTINE PROCESS
Bedside shift change report given to RUMA Campo RN (oncoming nurse) by WOOD Wells RN (offgoing nurse).  Report included the following information SBAR and MAR.

## 2020-08-29 NOTE — LACTATION NOTE
This note was copied from a baby's chart. Infant improving at breast. Mom is using a nipple shield due to her previous experience in nursing where her first child traumatized her nipples. Suggested to mom that she try to wean infant off of shield. We were able to manually express 20 gtts following nursing session.

## 2020-08-29 NOTE — ROUTINE PROCESS
Bedside shift change report given to Yi Cordoba RN (oncoming nurse) by Luis Alberto Jang. REY Campo (offgoing nurse). Report included the following information SBAR, Kardex, Procedure Summary, Intake/Output, MAR and Recent Results.

## 2020-08-30 VITALS
BODY MASS INDEX: 38.76 KG/M2 | RESPIRATION RATE: 16 BRPM | TEMPERATURE: 98 F | SYSTOLIC BLOOD PRESSURE: 104 MMHG | HEIGHT: 64 IN | OXYGEN SATURATION: 97 % | WEIGHT: 227 LBS | HEART RATE: 75 BPM | DIASTOLIC BLOOD PRESSURE: 56 MMHG

## 2020-08-30 LAB
ABO + RH BLD: NORMAL
BLD PROD TYP BPU: NORMAL
BLOOD BANK CMNT PATIENT-IMP: NORMAL
BLOOD GROUP ANTIBODIES SERPL: NORMAL
BPU ID: NORMAL
CROSSMATCH RESULT,%XM: NORMAL
SPECIMEN EXP DATE BLD: NORMAL
STATUS OF UNIT,%ST: NORMAL
UNIT DIVISION, %UDIV: 0

## 2020-08-30 PROCEDURE — 74011250637 HC RX REV CODE- 250/637: Performed by: OBSTETRICS & GYNECOLOGY

## 2020-08-30 PROCEDURE — 74011250636 HC RX REV CODE- 250/636: Performed by: OBSTETRICS & GYNECOLOGY

## 2020-08-30 PROCEDURE — 74011250637 HC RX REV CODE- 250/637: Performed by: ADVANCED PRACTICE MIDWIFE

## 2020-08-30 RX ORDER — OXYCODONE AND ACETAMINOPHEN 5; 325 MG/1; MG/1
1 TABLET ORAL
Qty: 20 TAB | Refills: 0 | Status: SHIPPED | OUTPATIENT
Start: 2020-08-30 | End: 2020-09-06

## 2020-08-30 RX ORDER — IBUPROFEN 800 MG/1
800 TABLET ORAL
Qty: 40 TAB | Refills: 1 | Status: SHIPPED | OUTPATIENT
Start: 2020-08-30 | End: 2020-10-21

## 2020-08-30 RX ORDER — DOCUSATE SODIUM 100 MG/1
100 CAPSULE, LIQUID FILLED ORAL
Qty: 30 CAP | Refills: 1 | Status: SHIPPED | OUTPATIENT
Start: 2020-08-30 | End: 2020-10-21

## 2020-08-30 RX ADMIN — SIMETHICONE 80 MG: 80 TABLET, CHEWABLE ORAL at 03:11

## 2020-08-30 RX ADMIN — OXYCODONE HYDROCHLORIDE AND ACETAMINOPHEN 1 TABLET: 5; 325 TABLET ORAL at 15:04

## 2020-08-30 RX ADMIN — OXYCODONE HYDROCHLORIDE AND ACETAMINOPHEN 1 TABLET: 5; 325 TABLET ORAL at 03:09

## 2020-08-30 RX ADMIN — Medication 1 TABLET: at 13:46

## 2020-08-30 RX ADMIN — IBUPROFEN 800 MG: 400 TABLET, FILM COATED ORAL at 06:27

## 2020-08-30 RX ADMIN — DOCUSATE SODIUM 100 MG: 100 CAPSULE, LIQUID FILLED ORAL at 10:45

## 2020-08-30 RX ADMIN — HUMAN RHO(D) IMMUNE GLOBULIN 0.3 MG: 300 INJECTION, SOLUTION INTRAMUSCULAR at 13:28

## 2020-08-30 RX ADMIN — IBUPROFEN 800 MG: 400 TABLET, FILM COATED ORAL at 15:04

## 2020-08-30 RX ADMIN — SIMETHICONE 80 MG: 80 TABLET, CHEWABLE ORAL at 14:08

## 2020-08-30 RX ADMIN — ACETAMINOPHEN 650 MG: 325 TABLET ORAL at 10:45

## 2020-08-30 NOTE — PROGRESS NOTES
Anesthesia Post Operative Day 1    Patient is s/p neuraxial Duramorph for  Section. The patient relates no discomfort, no itching and no nausea. There are no motor/sensation abnormalities and no complaints of a headache. Anesthesia site is normal, without erythema or tenderness. All symptoms were treated with protocol medications with good results. Patient is up and ambulating without complaints. Plan: Continue Duramorph protocol as needed. Reconsult PRN.     Mili Altamirano MD  9:39 PM

## 2020-08-30 NOTE — PROGRESS NOTES
Post-Operative  Day 2    [facility]     Assessment:   Post-Op day 2, doing well  Boy, being evaluated by peds for decreased tone/poor cry, deferring circ to outpatient  Rh neg, rhogam if needed    Plan:   1. Routine post-operative care  2. Pt desires early d/c home today, passing all discharge milestones, reviewed discharge instructions, meds etc, discharge order placed    Information for the patient's :  Eliot Vazquez [[id number]]   , Low Transverse    Patient doing well without significant complaint. Nausea and vomiting resolved, tolerating liquids, passing flatus, voiding and ambulating without difficulty. Vitals:  Visit Vitals  /66 (BP 1 Location: Right arm, BP Patient Position: Sitting)   Pulse 69   Temp 98.3 °F (36.8 °C)   Resp 16   Ht 5' 4\" (1.626 m)   Wt 103 kg (227 lb)   SpO2 97%   Breastfeeding Unknown   BMI 38.96 kg/m²     Temp (24hrs), Av.1 °F (36.7 °C), Min:98 °F (36.7 °C), Max:98.3 °F (36.8 °C)        Exam:        Patient without distress. Abdomen, bowel sounds present, soft, expected tenderness, fundus firm                Wound incision clean, dry and intact               Lower extremities are negative for swelling, cords or tenderness. Labs:   Lab Results   Component Value Date/Time    WBC 17.5 (H) 2020 06:12 AM    WBC 9.3 2020 09:02 AM    WBC 17.7 (H) 2019 03:59 AM    WBC 11.3 (H) 2019 09:35 AM    HGB 10.9 (L) 2020 06:12 AM    HGB 12.5 2020 09:02 AM    HGB 11.2 (L) 2019 03:59 AM    HGB 12.7 2019 09:35 AM    HCT 33.2 (L) 2020 06:12 AM    HCT 37.9 2020 09:02 AM    HCT 34.4 (L) 2019 03:59 AM    HCT 38.8 2019 09:35 AM    PLATELET 869  06:12 AM    PLATELET 105  09:02 AM    PLATELET 384  03:59 AM    PLATELET 795  09:35 AM       No results found for this or any previous visit (from the past 24 hour(s)).

## 2020-08-30 NOTE — DISCHARGE SUMMARY
Obstetrical Discharge Summary     Name: Carmelo Valerio MRN: [id number]  SSN: [id number]    YOB: 1980  Age: 45 y.o. Sex: female      Admit Date: 2020    Discharge Date: 2020     Admitting Physician: Shyam Hayes MD     Attending Physician:  Guerda Campos MD     Admission Diagnoses: History of  section [G18.171]    Discharge Diagnoses:   Information for the patient's :  Hamzah Julian [[id number]]   Delivery of a 3.29 kg male infant via , Low Transverse on 2020 at 11:46 AM  by Shyam Hayes. Apgars were 9  and 9 . Additional Diagnoses:   Hospital Problems  Date Reviewed: 2020          Codes Class Noted POA    S/P  section ICD-10-CM: Z98.891  ICD-9-CM: V45.89  2020 Unknown             Lab Results   Component Value Date/Time    Rubella, External immune 2020    GrBStrep, External negative 2020       Hospital Course:  Dressing changed twice with in hospital due to oozing from incision which resolved with pressure and new dressing was reapplied. Circ deferred to outpatient. Normal hospital course following the delivery. Disposition at Discharge: Home or self care    Discharged Condition: Stable    Patient Instructions:   Current Discharge Medication List      START taking these medications    Details   ibuprofen (MOTRIN) 800 mg tablet Take 1 Tab by mouth every eight (8) hours as needed for Pain. Qty: 40 Tab, Refills: 1      oxyCODONE-acetaminophen (PERCOCET) 5-325 mg per tablet Take 1 Tab by mouth every six (6) hours as needed for Pain for up to 7 days. Max Daily Amount: 4 Tabs. Qty: 20 Tab, Refills: 0    Associated Diagnoses: S/P  section      docusate sodium (COLACE) 100 mg capsule Take 1 Cap by mouth two (2) times daily as needed for Constipation for up to 90 days.   Qty: 30 Cap, Refills: 1         CONTINUE these medications which have NOT CHANGED    Details   LEVOXYL 112 mcg tablet Take 1 tab on Mon-Sat and skip Sunday. BRAND MEDICALLY NECESSARY  Qty: 90 Tab, Refills: 3      PNV ZJ.25/DEHQHEH FUM/FOLIC AC (PRENATAL PO) Take  by mouth daily. BIOTIN PO Take  by mouth. STOP taking these medications       aspirin 81 mg chewable tablet Comments:   Reason for Stopping:               Reference my discharge instructions.     Follow-up Appointments   Procedures    FOLLOW UP VISIT Appointment in: 6 Weeks     Standing Status:   Standing     Number of Occurrences:   1     Order Specific Question:   Appointment in     Answer:   6 Weeks        Signed By:  Jayde Pruitt MD     August 30, 2020

## 2020-08-30 NOTE — LACTATION NOTE
This note was copied from a baby's chart. Per mom, infant is improving at breast. She is using the shield, as needed. Suggestions for weaning infant off of the shield provided and also suggested that she manually express following nursing sessions where shield was used. Breasts may become engorged when milk \"comes in\". How milk is made / normal phases of milk production, supply and demand discussed. Taught care of engorged breasts - frequent breastfeeding encouraged, warm compresses and breast massage ac. Then nurse the baby or pump. Apply cold compresses pc x 15 minutes a few times a day for swelling or discomfort. May need to do this care for a couple of days. Discussed prevention and treatment of mastitis.

## 2020-08-30 NOTE — DISCHARGE INSTRUCTIONS
Virtual postpartum support group meetings available at [email or web address]    POSTPARTUM DISCHARGE INSTRUCTIONS       Name:  Mohamud Matthews  YOB: 1980  Admission Diagnosis:  History of  section [Z98.891]     Discharge Diagnosis:    Problem List as of 2020 Date Reviewed: 2020          Codes Class Noted - Resolved    S/P  section ICD-10-CM: G31.744  ICD-9-CM: V45.89  2020 - Present        Breech presentation ICD-10-CM: O32. 1XX0  ICD-9-CM: 652.20  5/3/2019 - Present        Obesity (BMI 30.0-34.9) ICD-10-CM: E66.9  ICD-9-CM: 278.00  2016 - Present        Hypothyroid ICD-10-CM: E03.9  ICD-9-CM: 244.9  Unknown - Present        RESOLVED: Breech delivery ICD-10-CM: O32. 1XX0  ICD-9-CM: 652.21  5/3/2019 - 2019            Attending Physician:  Manny Hayes MD    Delivery Type:   Section: What to Expect at Home    Your Recovery:  A  section, or , is surgery to deliver your baby through a cut, called an incision that the doctor makes in your lower belly and uterus. You may have some pain in your lower belly and need pain medicine for 1 to 2 weeks. You can expect some vaginal bleeding for several weeks. You will probably need about 6 weeks to fully recover. It is important to take it easy while the incision is healing. Avoid heavy lifting, strenuous activities, or exercises that strain the belly muscles while you are recovering. Ask a family member or friend for help with housework, cooking, and shopping. This care sheet gives you a general idea about how long it will take for you to recover. But each person recovers at a different pace. Follow the steps below to get better as quickly as possible. How can you care for yourself at home? Activity  · Rest when you feel tired. Getting enough sleep will help you recover. · Try to walk each day. Start by walking a little more than you did the day before.  Bit by bit, increase the amount you walk. Walking boosts blood flow and helps prevent pneumonia, constipation, and blood clots. · Avoid strenuous activities, such as bicycle riding, jogging, weightlifting, and aerobic exercise, for 6 weeks or until your doctor says it is okay. · Until your doctor says it is okay, do not lift anything heavier than your baby. · Do not do sit-ups or other exercise that strain the belly muscles for 6 weeks or until your doctor says it is okay. · Hold a pillow over your incision when you cough or take deep breaths. This will support your belly and decrease your pain. · You may shower as usual. Pat the incision dry when you are done. · You will have some vaginal bleeding. Wear sanitary pads. Do not douche or use tampons until your doctor says it is okay. · Ask your doctor when you can drive again. · You will probably need to take at least 6 weeks off work. It depends on the type of work you do and how you feel. · Wait until you are healed (about 4 to 6 weeks) before you have sexual intercourse. Your doctor will tell you when it is okay to have sex. · Talk to your doctor about birth control. You can get pregnant even before your period returns. Also, you can get pregnant while you are breast-feeding. Incision care  Your skin is your body's first line of defense against germs, but an incision site leaves an easy way for germs to enter your body. To prevent infection:  · Clean your hands frequently and before and after changing any touching any dressings. · If you have strips of tape on the incision, leave the tape on for a week or until it falls off. · Look at your incision closely every day for any changes. Contact your doctor if you experience any signs of infection, such as fever or increased redness at the surgical site. · Wash the area daily with warm, soapy water, and pat it dry. Don't use hydrogen peroxide or alcohol, which can slow healing.  You may cover the area with a gauze bandage if it weeps or rubs against clothing. Change the bandage every day. · Keep the area clean and dry. Diet  · You can eat your normal diet. If your stomach is upset, try bland, low-fat foods like plain rice, broiled chicken, toast, and yogurt. · Drink plenty of fluids (unless your doctor tells you not to). · You may notice that your bowel movements are not regular right after your surgery. This is common. Try to avoid constipation and straining with bowel movements. You may want to take a fiber supplement every day. If you have not had a bowel movement after a couple of days, ask your doctor about taking a mild laxative. · If you are breast-feeding, do not drink any alcohol. Medicines  · Take pain medicines exactly as directed. · If the doctor gave you a prescription medicine for pain, take it as prescribed. · If you are not taking a prescription pain medicine, ask your doctor if you can take an over-the-counter medicine such as acetaminophen (Tylenol), ibuprofen (Advil, Motrin), or naproxen (Aleve), for cramps. Read and follow all instructions on the label. Do not take aspirin, because it can cause more bleeding. Do not take acetaminophen (Tylenol) and other acetaminophen containing medications (i.e. Percocet) at the same time. · If you think your pain medicine is making you sick to your stomach:  · Take your medicine after meals (unless your doctor has told you not to). · Ask your doctor for a different pain medicine. · If your doctor prescribed antibiotics, take them as directed. Do not stop taking them just because you feel better. You need to take the full course of antibiotics. Mental Health  · Many women get the \"baby blues\" during the first few days after childbirth. You may lose sleep, feel irritable, and cry easily. You may feel happy one minute and sad the next. Hormone changes are one cause of these emotional changes.  Also, the demands of a new baby, along with visits from relatives or other family needs, add to a mother's stress. The \"baby blues\" often peak around the fourth day. Then they ease up in less than 2 weeks. · If your moodiness or anxiety lasts for more than 2 weeks, or if you feel like life is not worth living, you may have postpartum depression. This is different for each mother. Some mothers with serious depression may worry intensely about their infant's well-being. Others may feel distant from their child. Some mothers might even feel that they might harm their baby. A mother may have signs of paranoia, wondering if someone is watching her. · With all the changes in your life, you may not know if you are depressed. Pregnancy sometimes causes changes in how you feel that are similar to the symptoms of depression. · Symptoms of depression include:  · Feeling sad or hopeless and losing interest in daily activities. These are the most common symptoms of depression. · Sleeping too much or not enough. · Feeling tired. You may feel as if you have no energy. · Eating too much or too little. · POSTPARTUM [employer]PPORT [employer] (PSI) offers a Warm line; Chat with the Expert phone sessions; Information and Articles about Pregnancy and Postpartum Mood Disorders; Comprehensive List of Free Support Groups; Knowledgeable local coordinators who will offer support, information, and resources; Guide to Resources on [employer]; Calendar of events in the  mood disorders community; Latest News and Research; and [facility] [address] for [employer]. Remember - You are not alone; You are not to blame; With help, you will be well. 4-695-794-PPD(7944). WWW. POSTPARTUM. NET   · Writing or talking about death, such as writing suicide notes or talking about guns, knives, or pills. Keep the numbers for these national suicide hotlines: 6-244-185-TALK (3-188.212.7271) and 8-322-SMTHSJJ (3-611.767.5931). If you or someone you know talks about suicide or feeling hopeless, get help right away.     Other instructions  · If you breast-feed your baby, you may be more comfortable while you are healing if you place the baby so that he or she is not resting on your belly. Try tucking your baby under your arm, with his or her body along the side you will be feeding on. Support your baby's upper body with your arm. With that hand you can control your baby's head to bring his or her mouth to your breast. This is sometimes called the football hold. Follow-up care is a key part of your treatment and safety. Be sure to make and go to all appointments, and call your doctor if you are having problems. It's also a good idea to know your test results and keep a list of the medicines you take. When should you call for help? Call 911 anytime you think you may need emergency care. For example, call if:  · You are thinking of hurting yourself, your baby, or anyone else. · You passed out (lost consciousness). · You have symptoms of a blood clot in your lung (called a pulmonary embolism). These may include:  · Sudden chest pain. · Trouble breathing. · Coughing up blood. Call your doctor now or seek immediate medical care if:    · You have severe vaginal bleeding. · You are soaking through a pad each hour for 2 or more hours. · Your vaginal bleeding seems to be getting heavier or is still bright red 4 days after delivery. · You are dizzy or lightheaded, or you feel like you may faint. · You are vomiting or cannot keep fluids down. · You have a fever. · You have new or more belly pain. · You have loose stitches, or your incision comes open. · You have symptoms of infection, such as:  · Increased pain, swelling, warmth, or redness. · Red streaks leading from the incision. · Pus draining from the incision. · A fever  · You pass tissue (not just blood). · Your vaginal discharge smells bad. · Your belly feels tender or full and hard. · Your breasts are continuously painful or red.   · You feel sad, anxious, or hopeless for more than a few days. · You have sudden, severe pain in your belly. · You have symptoms of a blood clot in your leg (called a deep vein thrombosis), such as:  · Pain in your calf, back of the knee, thigh, or groin. · Redness and swelling in your leg or groin. · You have symptoms of preeclampsia, such as:  · Sudden swelling of your face, hands, or feet. · New vision problems (such as dimness or blurring). · A severe headache. · Your blood pressure is higher than it should be or rises suddenly. · You have new nausea or vomiting. Watch closely for changes in your health, and be sure to contact your doctor if you have any problems. Additional Information:  Not applicable    These are general instructions for a healthy lifestyle:    No smoking/ No tobacco products/ Avoid exposure to second hand smoke    Surgeon General's Warning:  Quitting smoking now greatly reduces serious risk to your health. Obesity, smoking, and sedentary lifestyle greatly increases your risk for illness    A healthy diet, regular physical exercise & weight monitoring are important for maintaining a healthy lifestyle    Recognize signs and symptoms of STROKE:    F-face looks uneven    A-arms unable to move or move unevenly    S-speech slurred or non-existent    T-time-call 911 as soon as signs and symptoms begin - DO NOT go       back to bed or wait to see if you get better - TIME IS BRAIN. I have had the opportunity to make my options or choices for discharge. I have received and understand these instructions. POST DELIVERY DISCHARGE INSTRUCTIONS    Name: Amol Troncoso  YOB: 1980  Primary Diagnosis: Active Problems:    S/P  section (2020)        General:     Diet/Diet Restrictions:  · Eight 8-ounce glasses of fluid daily (water, juices); avoid excessive caffeine intake.   · Meals/snacks as desired which are high in fiber and carbohydrates and low in fat and cholesterol. Physical Activity / Restrictions / Safety:     · Avoid heavy lifting, no more that 8 lbs. For 2-3 weeks;   · Limit use of stairs to 2 times daily for the first week home. · No driving for one week. · Avoid intercourse 4-6 weeks, no douching or tampon use. · Check with obstetrician before starting or resuming an exercise program.      Discharge Instructions/Special Treatment/Home Care Needs:     · Continue prenatal vitamins. · Continue to use squirt bottle with warm water on your episiotomy after each bathroom use until bleeding stops. · If steri-strips applied to your incision, remove in 7-10 days. Call your doctor for the following:     · Fever over 101 degrees by mouth. · Vaginal bleeding heavier than a normal menstrual period or clots larger than a golf ball. · Red streaks or increased swelling of legs, painful red streaks on your breast.  · Painful urination, constipation and increased pain or swelling or discharge with your incision. · If you feel extremely anxious or overwhelmed. · If you have thoughts of harming yourself and/or your baby. Pain Management:     · Take Acetaminophen (Tylenol) or Ibuprofen (Advil, Motrin), as directed for pain. · Use a warm Sitz bath 3 times daily to relieve episiotomy or hemorrhoidal discomfort. · For hemorrhoidal discomfort, use Tucks and Anusol cream as needed and directed. · Heating pad to  incision as needed.      Follow-Up Care:     Appointment with MD:   Follow-up Appointments   Procedures    FOLLOW UP VISIT Appointment in: 6 Weeks     Standing Status:   Standing     Number of Occurrences:   1     Order Specific Question:   Appointment in     Answer:   6 Weeks     Telephone number: [phone]    Signed By: Selin Hoffman MD                                                                                                   Date: 2020 Time: 9:22 AM

## 2020-08-30 NOTE — ROUTINE PROCESS
Bedside shift change report given to RUMA Campo RN (oncoming nurse) by Kim Rosales RN (offgoing nurse).  Report included the following information SBAR, Intake/Output and MAR.

## 2020-10-02 LAB
T4 FREE SERPL-MCNC: 1.33 NG/DL (ref 0.82–1.77)
TSH SERPL DL<=0.005 MIU/L-ACNC: 0.27 UIU/ML (ref 0.45–4.5)

## 2020-10-07 DIAGNOSIS — E03.9 ACQUIRED HYPOTHYROIDISM: ICD-10-CM

## 2020-10-07 DIAGNOSIS — E66.9 OBESITY (BMI 30.0-34.9): ICD-10-CM

## 2020-10-21 ENCOUNTER — VIRTUAL VISIT (OUTPATIENT)
Dept: ENDOCRINOLOGY | Age: 40
End: 2020-10-21
Payer: COMMERCIAL

## 2020-10-21 DIAGNOSIS — E66.9 OBESITY (BMI 30.0-34.9): ICD-10-CM

## 2020-10-21 DIAGNOSIS — E03.9 ACQUIRED HYPOTHYROIDISM: Primary | ICD-10-CM

## 2020-10-21 PROCEDURE — 99214 OFFICE O/P EST MOD 30 MIN: CPT | Performed by: INTERNAL MEDICINE

## 2020-10-21 RX ORDER — NORETHINDRONE AND ETHINYL ESTRADIOL 0.5-0.035
KIT ORAL
COMMUNITY
Start: 2020-10-12

## 2020-10-21 NOTE — PATIENT INSTRUCTIONS
1) TSH is a thyroid test.  Your level is 0.28 which is slightly low and below goal of 0.45-2.0. This test goes opposite of your thyroid dose and suggests your dose of levoxyl is possibly more than you need but because you just started a birth control pill, this should work out to be the right dose so I'll have you stay on 6 tabs/week for the next 2 months. 2) I put an order directly into the [facility] system to repeat your labs in 2 months and in the 1-2 weeks prior to your next visit so just ask for the order under my name and you will receive a courtesy reminder through [employer] to have these drawn. 3) Please come for a follow up visit on 4/21/21 at 8:30am in our [facility] office. 4) I will plan on seeing you back in 6 months. If your weight goes down by more than 10 lbs, let me know and I'm happy to check your labs sooner to see if a dose change is needed.

## 2020-10-21 NOTE — PROGRESS NOTES
Chief Complaint   Patient presents with    Thyroid Problem     pcp and pharmacy confirmed    Other     e-mail- [email or web address]       **THIS IS A VIRTUAL VISIT VIA A VIDEO SYNCHRONOUS DISCUSSION. PATIENT AGREED TO HAVE THEIR CARE DELIVERED OVER A DOXY. ME VIDEO VISIT IN PLACE OF THEIR REGULARLY SCHEDULED OFFICE VISIT**    History of Present Illness: Tracy Serrano is a 45 y.o. female here for follow up of thyroid. Delivered her son Tonie Cm, via  on 20. Compliant with levoxyl 6 tabs a week. Currently bottle feeding. Bowels are regular. No heat or cold intolerance. No hair loss. No chest pain or palpitations or tremors. Her son sleeps through the night. Her labs were drawn before starting the OCP as she only started this 3 days ago. Still takes her levoxyl at bedtime and her vitamin is in the morning and hasn't missed any doses. Will be working to get her weight down over the next 6 months as it's currently up to 206 from 182 in . Current Outpatient Medications   Medication Sig    Nortrel 0.5/35, 28, 0.5-35 mg-mcg tab TK 1 T PO QD    LEVOXYL 112 mcg tablet Take 1 tab on Mon-Sat and skip . BRAND MEDICALLY NECESSARY    PNV NG.51/UNXWQQD FUM/FOLIC AC (PRENATAL PO) Take  by mouth daily. No current facility-administered medications for this visit.       Allergies   Allergen Reactions    Erythromycin Rash     Review of Systems: PER HPI    Physical Examination:  - GENERAL: NCAT, Appears well nourished   - EYES: EOMI, non-icteric, no proptosis   - Ear/Nose/Throat: NCAT, no visible inflammation or masses   - CARDIOVASCULAR: no cyanosis, no visible JVD   - RESPIRATORY: respiratory effort normal without any distress or labored breathing   - MUSCULOSKELETAL: Normal ROM of neck and upper extremities observed   - SKIN: No rash on face  - NEUROLOGIC:  No facial asymmetry (Cranial nerve 7 motor function), No gaze palsy   - PSYCHIATRIC: Normal affect, Normal insight and judgement Data Reviewed:   Component      Latest Ref Rng & Units 10/1/2020 10/1/2020          10:14 AM 10:14 AM   T4, Free      0.82 - 1.77 ng/dL  1.33   TSH      0.450 - 4.500 uIU/mL 0.268 (L)        Assessment/Plan:     1. Acquired hypothyroidism: When she was 16, recalls having a lot of fatigue and was checked and found to have hypothyroidism and has been on medication ever since. Had been on levothyroxine 50 mcg daily for years and may have been on brand levoxyl as a teenager but not recently. Was taking this at the same time as her calcium and her OCP and also drinks coffee within 30 minutes. TSH was 2.59 in 7/15 and TPO ab was normal.  Increased her dose to 75 mcg daily and advised her to move the thyroid med away from the calcium and coffee to improve absorption and she has been taking at bedtime. TSH down to 1.36 in 1/16 and changed to Levoxyl and TSH 1.57 in 7/16. Down to 1.49 in 11/16 so increased dose to 88 mcg daily to try and get TSH < 1 to help with conception and became pregnant in 4/17 and I increased her dose to 1 tab on Mon-Fri and 2 tabs on Sat-Sun and TSH 1.08 in 5/17 so had her stay on this dose but then miscarried at the end of 5/17. Got pregnant again in 9/17 and miscarried in 10/17. TSH down to 0.46 in 1/18 so her current dose is still correct and not the reason for her miscarriages. I changed to 112 mcg tabs to take 1 tab daily to get a consistent dose in her system and allow for titration on the weekends when she becomes pregnant again and TSH down to 0.317 in 7/18 with losing 15 lbs so had her decrease to 6.5 tab/week. Became pregnant in 8/18 and increased her dose to 8.5 tabs/week and TSH 0.03 in 10/18 and decreased her dose to 8 tabs/week and later that month TSH up to 0.12 so decreased to 7.5 tabs/week and TSH up to 0.29 in 12/18 so kept dose the same. TSH down to 0.18 in 1/19 so decreased to 7 tabs/week and TSH 0.37 in 3/19 so kept dose the same.   Delivered on 5/3/19 and asked her to decrease dose to 6 tabs/week after delivery but she forgot and TSH 0.057 in 6/19 so did this then and TSH 1.1 in 8/19 and 0.93 in 1/20 so kept dose the same. She was 9 weeks pregnant at that time and her TSH remained normal on 6 tabs/week during pregnancy and delivered in 8/20. TSH down to 0.28 in 10/20 but she just started OCP after her labs were drawn so will keep her dose the same for the next 2 months. - check TSH and free T4 in 2 months and prior to next visit  - cont levoxyl 112 mcg 1 tab on Mon-Sat and none on Sun at bedtime        2. Obesity (BMI 30.0-34. 9): Had lost 12 lbs with starting phentermine in 7/16 but gained some back over Thanksgiving. Has stopped since being pregnant in 4/17 and then miscarried. Will stay off while still trying to conceive. Wt down 15 lbs from 1/18 to 7/18.  Up 21 lbs by 1/19 but is pregnant and delivered in 5/19 and up 12 lbs by 6/19. Down 19 lbs by 1/20 with keto diet but during pregnancy jayme 24 lbs by 10/20 so will work to get this down. - cont diet and exercise        Patient Instructions   1) TSH is a thyroid test.  Your level is 0.28 which is slightly low and below goal of 0.45-2.0. This test goes opposite of your thyroid dose and suggests your dose of levoxyl is possibly more than you need but because you just started a birth control pill, this should work out to be the right dose so I'll have you stay on 6 tabs/week for the next 2 months. 2) I put an order directly into the [employer] system to repeat your labs in 2 months and in the 1-2 weeks prior to your next visit so just ask for the order under my name and you will receive a courtesy reminder through [employer] to have these drawn. 3) Please come for a follow up visit on 4/21/21 at 8:30am in our [facility] office. 4) I will plan on seeing you back in 6 months.   If your weight goes down by more than 10 lbs, let me know and I'm happy to check your labs sooner to see if a dose change is needed. Follow-up and Dispositions    · Return 4/21/21 at 8:30am.               Copy sent to:  Dr. Marco A Meier    Lab follow up: 12/10/20  Component      Latest Ref Rng & Units 12/9/2020 12/9/2020          11:15 AM 11:15 AM   TSH      0.450 - 4.500 uIU/mL  1.960   T4, Free      0.82 - 1.77 ng/dL 1.39      Sent her the following message through [employer]:  TSH is a thyroid test.  Your level is 1.96 which is normal and at goal of 0.45-2.0 but you tend to feel better with a TSH closer to the lower part of normal.  This test goes opposite of your thyroid dose and suggests your dose of levoxyl is not quite enough with starting the birth control pill and I recommend taking 1 tab 7 days a week until your visit in April. I sent an updated prescription for this dose to [facility]. As long as you are feeling better, we can just repeat your labs the week before the visit but if you feel like you need to have them checked sooner, just let me know and I can put an order in the system.

## 2020-12-08 DIAGNOSIS — E66.9 OBESITY (BMI 30.0-34.9): ICD-10-CM

## 2020-12-08 DIAGNOSIS — E03.9 ACQUIRED HYPOTHYROIDISM: ICD-10-CM

## 2020-12-10 LAB
T4 FREE SERPL-MCNC: 1.39 NG/DL (ref 0.82–1.77)
TSH SERPL DL<=0.005 MIU/L-ACNC: 1.96 UIU/ML (ref 0.45–4.5)

## 2020-12-10 RX ORDER — LEVOTHYROXINE SODIUM 112 UG/1
TABLET ORAL
Qty: 90 TAB | Refills: 3 | Status: SHIPPED | OUTPATIENT
Start: 2020-12-10 | End: 2021-04-21

## 2021-04-07 DIAGNOSIS — E03.9 ACQUIRED HYPOTHYROIDISM: ICD-10-CM

## 2021-04-07 DIAGNOSIS — E66.9 OBESITY (BMI 30.0-34.9): ICD-10-CM

## 2021-04-15 LAB
T4 FREE SERPL-MCNC: 1.25 NG/DL (ref 0.82–1.77)
TSH SERPL DL<=0.005 MIU/L-ACNC: 0.72 UIU/ML (ref 0.45–4.5)

## 2021-04-21 ENCOUNTER — OFFICE VISIT (OUTPATIENT)
Dept: ENDOCRINOLOGY | Age: 41
End: 2021-04-21
Payer: COMMERCIAL

## 2021-04-21 VITALS — DIASTOLIC BLOOD PRESSURE: 60 MMHG | WEIGHT: 170 LBS | BODY MASS INDEX: 29.18 KG/M2 | SYSTOLIC BLOOD PRESSURE: 90 MMHG

## 2021-04-21 DIAGNOSIS — E03.9 ACQUIRED HYPOTHYROIDISM: Primary | ICD-10-CM

## 2021-04-21 DIAGNOSIS — E66.9 OBESITY (BMI 30.0-34.9): ICD-10-CM

## 2021-04-21 PROCEDURE — 99214 OFFICE O/P EST MOD 30 MIN: CPT | Performed by: INTERNAL MEDICINE

## 2021-04-21 RX ORDER — LEVOTHYROXINE SODIUM 112 UG/1
TABLET ORAL
Qty: 90 TAB | Refills: 3
Start: 2021-04-21 | End: 2022-01-15 | Stop reason: SDUPTHER

## 2021-04-21 RX ORDER — LIOTHYRONINE SODIUM 5 UG/1
5 TABLET ORAL DAILY
Qty: 90 TAB | Refills: 3 | Status: SHIPPED | OUTPATIENT
Start: 2021-04-21 | End: 2022-04-19 | Stop reason: SDUPTHER

## 2021-04-21 NOTE — PATIENT INSTRUCTIONS
1) Your TSH (thyroid test) is normal and at goal of 0.5-1.0. However, we will decrease your dose of levoxyl to 1 tab on Mon-Sat and none on Sunday and instead add cytomel (liothyronine) 5 mcg once daily in the morning 30 min before coffee 7 days a week as this will give you T3 which is the active thyroid hormone and some patients feel better having both T4 and T3 in their system. This will be ready for  at the pharmacy today. 2) I put an order directly into the [facility] system to repeat your labs in 6 weeks and in the 1-2 weeks prior to your next visit so just ask for the order under my name and you will receive a courtesy reminder through [employer] to have these drawn.

## 2021-04-21 NOTE — PROGRESS NOTES
Chief Complaint   Patient presents with    Thyroid Problem     pcp and pharmacy confirmed     History of Present Illness: Soham Monae is a 37 y.o. female here for follow up of thyroid. Started the [employer] weight loss program in November and has lost down from 206 to 170 today. Has been taking 1 tab of Levoxyl 7 days a week at night 2 hours after dinner and hasn't missed any doses. MVI is in the morning. Bowels are regular and periods are starting to regulate better. Still has some hair loss due to the diet she is on and some toenail brittleness. Doesn't get a lot of calcium in her diet. No heat or cold intolerance. No chest pain, palpitations, or tremors. Still has some fatigue but her son, Jen Castro, will be 8 months next week. Does sleep 7-8 hours a night. Willing to try T3 to see if she feels better on this. Current Outpatient Medications   Medication Sig   Nata Terrazas one a day vitamin    OTHER,NON-FORMULARY, as needed. Stool softner    docosahexaenoic acid/epa (FISH OIL PO) Take  by mouth daily.  LevoxyL 112 mcg tablet Take 1 tab 7 days a week at bedtime. BRAND MEDICALLY NECESSARY    Nortrel 0.5/35, 28, 0.5-35 mg-mcg tab TK 1 T PO QD     No current facility-administered medications for this visit.       Allergies   Allergen Reactions    Erythromycin Rash     Review of Systems:  - Cardiovascular: no chest pain  - Neurological: no tremors  - Integumentary: skin is normal    Physical Examination:  Blood pressure 90/60, weight 170 lb (77.1 kg), unknown if currently breastfeeding.  - General: pleasant, no distress, good eye contact   - Neck: small goiter  - Cardiovascular: regular, normal rate, nl s1 and s2, no m/r/g   - Respiratory: clear to auscultation bilaterally   - Integumentary: skin is normal, no edema  - Neurological: reflexes 2+ at biceps, no tremors  - Psychiatric: normal mood and affect    Data Reviewed:   Component      Latest Ref Rng & Units 4/14/2021 4/14/2021 9:02 AM  9:02 AM   T4, Free      0.82 - 1.77 ng/dL  1.25   TSH      0.450 - 4.500 uIU/mL 0.716      Assessment/Plan:     1. Acquired hypothyroidism: When she was 16, recalls having a lot of fatigue and was checked and found to have hypothyroidism and has been on medication ever since. Had been on levothyroxine 50 mcg daily for years and may have been on brand levoxyl as a teenager but not recently. Was taking this at the same time as her calcium and her OCP and also drinks coffee within 30 minutes. TSH was 2.59 in 7/15 and TPO ab was normal.  Increased her dose to 75 mcg daily and advised her to move the thyroid med away from the calcium and coffee to improve absorption and she has been taking at bedtime. TSH down to 1.36 in 1/16 and changed to Levoxyl and TSH 1.57 in 7/16. Down to 1.49 in 11/16 so increased dose to 88 mcg daily to try and get TSH < 1 to help with conception and became pregnant in 4/17 and I increased her dose to 1 tab on Mon-Fri and 2 tabs on Sat-Sun and TSH 1.08 in 5/17 so had her stay on this dose but then miscarried at the end of 5/17. Got pregnant again in 9/17 and miscarried in 10/17. TSH down to 0.46 in 1/18 so her current dose is still correct and not the reason for her miscarriages. I changed to 112 mcg tabs to take 1 tab daily to get a consistent dose in her system and allow for titration on the weekends when she becomes pregnant again and TSH down to 0.317 in 7/18 with losing 15 lbs so had her decrease to 6.5 tab/week. Became pregnant in 8/18 and increased her dose to 8.5 tabs/week and TSH 0.03 in 10/18 and decreased her dose to 8 tabs/week and later that month TSH up to 0.12 so decreased to 7.5 tabs/week and TSH up to 0.29 in 12/18 so kept dose the same. TSH down to 0.18 in 1/19 so decreased to 7 tabs/week and TSH 0.37 in 3/19 so kept dose the same.   Delivered on 5/3/19 and asked her to decrease dose to 6 tabs/week after delivery but she forgot and TSH 0.057 in 6/19 so did this then and TSH 1.1 in 8/19 and 0.93 in 1/20 so kept dose the same. She was 9 weeks pregnant at that time and her TSH remained normal on 6 tabs/week during pregnancy and delivered in 8/20. TSH down to 0.28 in 10/20 but she just started OCP after her labs were drawn so kept her dose the same. TSH 1.96 in 12/20 so increased back to 1 tab daily and TSH 0.72 in 4/21. Still having some fatigue so added cytomel 5 mcg daily and decreased levoxyl to 6 tabs/week. - begin cytomel 5 mcg daily  - decrease levoxyl 112 mcg to 6 tabs/week  - check TSH and free T4 and total T3 in 6 weeks and prior to next visit          2. Obesity (BMI 30.0-34. 9): Had lost 12 lbs with starting phentermine in 7/16 but gained some back over Thanksgiving. Has stopped since being pregnant in 4/17 and then miscarried. Will stay off while still trying to conceive. Wt down 15 lbs from 1/18 to 7/18.  Up 21 lbs by 1/19 but is pregnant and delivered in 5/19 and up 12 lbs by 6/19. Down 19 lbs by 1/20 with keto diet but during pregnancy jayme 24 lbs by 10/20 and with starting [facility] weight loss in 11/20 down 36 lbs by 4/21.  - cont medi weight loss  - cont diet and exercise        Patient Instructions   1) Your TSH (thyroid test) is normal and at goal of 0.5-1.0. However, we will decrease your dose of levoxyl to 1 tab on Mon-Sat and none on Sunday and instead add cytomel (liothyronine) 5 mcg once daily in the morning 30 min before coffee 7 days a week as this will give you T3 which is the active thyroid hormone and some patients feel better having both T4 and T3 in their system. This will be ready for  at the pharmacy today. 2) I put an order directly into the [facility] system to repeat your labs in 6 weeks and in the 1-2 weeks prior to your next visit so just ask for the order under my name and you will receive a courtesy reminder through [employer] to have these drawn.                Follow-up and Dispositions    · Return 11/3/21 at 8:30am. Copy sent to:  Dr. Chun Matute    Lab follow up: 6/10/21  Component      Latest Ref Rng & Units 6/7/2021 6/7/2021 6/7/2021           8:59 AM  8:59 AM  8:59 AM   T4, Free      0.82 - 1.77 ng/dL   1.18   TSH      0.450 - 4.500 uIU/mL  0.844    T3, total      71 - 180 ng/dL 120       Sent her the following message through [employer]:  TSH is a thyroid test.  Your level is 0.84 which is normal and at goal of 0.45-2.0. This test goes opposite of your thyroid dose and suggests your dose of levoxyl and cytomel is perfect so I will keep your dose the same. I hope you are starting to feel better with this new regimen.

## 2021-06-02 DIAGNOSIS — E66.9 OBESITY (BMI 30.0-34.9): ICD-10-CM

## 2021-06-02 DIAGNOSIS — E03.9 ACQUIRED HYPOTHYROIDISM: ICD-10-CM

## 2021-06-08 LAB
T3 SERPL-MCNC: 120 NG/DL (ref 71–180)
T4 FREE SERPL-MCNC: 1.18 NG/DL (ref 0.82–1.77)
TSH SERPL DL<=0.005 MIU/L-ACNC: 0.84 UIU/ML (ref 0.45–4.5)

## 2021-07-05 RX ORDER — LEVOTHYROXINE SODIUM 112 UG/1
112 TABLET ORAL
Qty: 10 TABLET | Refills: 0 | Status: SHIPPED | OUTPATIENT
Start: 2021-07-05 | End: 2021-11-03

## 2021-10-20 DIAGNOSIS — E66.9 OBESITY (BMI 30.0-34.9): ICD-10-CM

## 2021-10-20 DIAGNOSIS — E03.9 ACQUIRED HYPOTHYROIDISM: ICD-10-CM

## 2021-10-27 LAB
T3 SERPL-MCNC: 123 NG/DL (ref 71–180)
T4 FREE SERPL-MCNC: 1.39 NG/DL (ref 0.82–1.77)
TSH SERPL DL<=0.005 MIU/L-ACNC: 0.16 UIU/ML (ref 0.45–4.5)

## 2021-11-03 ENCOUNTER — OFFICE VISIT (OUTPATIENT)
Dept: ENDOCRINOLOGY | Age: 41
End: 2021-11-03
Payer: COMMERCIAL

## 2021-11-03 VITALS
SYSTOLIC BLOOD PRESSURE: 90 MMHG | BODY MASS INDEX: 27.14 KG/M2 | HEIGHT: 64 IN | DIASTOLIC BLOOD PRESSURE: 54 MMHG | HEART RATE: 66 BPM | WEIGHT: 159 LBS

## 2021-11-03 DIAGNOSIS — E66.9 OBESITY (BMI 30.0-34.9): ICD-10-CM

## 2021-11-03 DIAGNOSIS — E03.9 ACQUIRED HYPOTHYROIDISM: Primary | ICD-10-CM

## 2021-11-03 PROCEDURE — 99214 OFFICE O/P EST MOD 30 MIN: CPT | Performed by: INTERNAL MEDICINE

## 2021-11-03 NOTE — PATIENT INSTRUCTIONS
1) TSH is a thyroid test.  Your level is 0.16 which is low and below goal of 0.45-2.0. This test goes opposite of your thyroid dose and suggests your dose of levoxyl and cytomel is more than you need for your current weight. I will decrease your dose of levoxyl back to 1 tab 6 days a week but stay on 1 tab 7 days a week of cytomel. 2) I put an order directly into the [employer] system to repeat your labs in 2 months and in the 1-2 weeks prior to your next visit so just ask for the order under my name and you will receive a courtesy reminder through [employer] to have these drawn.

## 2021-11-03 NOTE — PROGRESS NOTES
Chief Complaint   Patient presents with    Thyroid Problem     pcp and pharmacy confirmed     History of Present Illness: Josue Pal is a 37 y.o. female here for follow up of thyroid. No chest pain or palpitations or tremors or shortness of breath. Bowels are regular. Occasionally have a hot flash but no heat intolerance. Overall energy is ok but definitely has some fatigue with her 2.4 yo daughter and 15 mo son. Weight has gotten down from 170 to closer to 154 on her scales and up a few lbs after Halloween but goal is to get to 150 lbs. Has inadvertently been back on 1 tab daily of levoxyl instead of 6 tabs/week and takes the cytomel 1 tab daily. Current Outpatient Medications   Medication Sig    LevoxyL 112 mcg tablet Take 1 Tablet by mouth Daily (before breakfast). If can't get brand levoxyl--ok to fill generic levothyroxine for this vacation supply--patient will pay cash if insurance won't cover. Nata Das one a day vitamin    OTHER,NON-FORMULARY, as needed. Stool softner    docosahexaenoic acid/epa (FISH OIL PO) Take  by mouth daily.  liothyronine (Cytomel) 5 mcg tablet Take 1 Tab by mouth daily.  Nortrel 0.5/35, 28, 0.5-35 mg-mcg tab TK 1 T PO QD    LevoxyL 112 mcg tablet Take 1 tab 6 days a week at bedtime. BRAND MEDICALLY NECESSARY--Dose change 4/21/21--updated med list--did not send prescription to the pharmacy (Patient not taking: Reported on 11/3/2021)     No current facility-administered medications for this visit.      Allergies   Allergen Reactions    Erythromycin Rash     Review of Systems: PER HPI    Physical Examination:  Blood pressure (!) 90/54, pulse 66, height 5' 4\" (1.626 m), weight 159 lb (72.1 kg), unknown if currently breastfeeding.  - General: pleasant, no distress, good eye contact   - Neck: small goiter  - Cardiovascular: regular, normal rate, nl s1 and s2, no m/r/g   - Respiratory: clear to auscultation bilaterally   - Integumentary: skin is normal, no edema  - Neurological: reflexes 2+ at biceps, mild tremor  - Psychiatric: normal mood and affect    Data Reviewed:   Component      Latest Ref Rng & Units 10/26/2021 10/26/2021 10/26/2021          10:18 AM 10:18 AM 10:18 AM   T4, Free      0.82 - 1.77 ng/dL   1.39   TSH      0.450 - 4.500 uIU/mL  0.162 (L)    T3, total      71 - 180 ng/dL 123         Assessment/Plan:     1. Acquired hypothyroidism: When she was 16, recalls having a lot of fatigue and was checked and found to have hypothyroidism and has been on medication ever since. Had been on levothyroxine 50 mcg daily for years and may have been on brand levoxyl as a teenager but not recently. Was taking this at the same time as her calcium and her OCP and also drinks coffee within 30 minutes. TSH was 2.59 in 7/15 and TPO ab was normal.  Increased her dose to 75 mcg daily and advised her to move the thyroid med away from the calcium and coffee to improve absorption and she has been taking at bedtime. TSH down to 1.36 in 1/16 and changed to Levoxyl and TSH 1.57 in 7/16. Down to 1.49 in 11/16 so increased dose to 88 mcg daily to try and get TSH < 1 to help with conception and became pregnant in 4/17 and I increased her dose to 1 tab on Mon-Fri and 2 tabs on Sat-Sun and TSH 1.08 in 5/17 so had her stay on this dose but then miscarried at the end of 5/17. Got pregnant again in 9/17 and miscarried in 10/17. TSH down to 0.46 in 1/18 so her current dose is still correct and not the reason for her miscarriages. I changed to 112 mcg tabs to take 1 tab daily to get a consistent dose in her system and allow for titration on the weekends when she becomes pregnant again and TSH down to 0.317 in 7/18 with losing 15 lbs so had her decrease to 6.5 tab/week.   Became pregnant in 8/18 and increased her dose to 8.5 tabs/week and TSH 0.03 in 10/18 and decreased her dose to 8 tabs/week and later that month TSH up to 0.12 so decreased to 7.5 tabs/week and TSH up to 0.29 in 12/18 so kept dose the same. TSH down to 0.18 in 1/19 so decreased to 7 tabs/week and TSH 0.37 in 3/19 so kept dose the same. Delivered on 5/3/19 and asked her to decrease dose to 6 tabs/week after delivery but she forgot and TSH 0.057 in 6/19 so did this then and TSH 1.1 in 8/19 and 0.93 in 1/20 so kept dose the same. She was 9 weeks pregnant at that time and her TSH remained normal on 6 tabs/week during pregnancy and delivered in 8/20. TSH down to 0.28 in 10/20 but she just started OCP after her labs were drawn so kept her dose the same. TSH 1.96 in 12/20 so increased back to 1 tab daily and TSH 0.72 in 4/21. Still having some fatigue so added cytomel 5 mcg daily and decreased levoxyl to 6 tabs/week and TSH 0.84 in 6/21 so kept dose the same. Went back to 7 tabs/week of levoxyl on accident and lost 11 lbs and TSH 0.16 in 10/21 so had her go back to 6 tabs/week of levoxyl.  - cont cytomel 5 mcg daily  - decrease levoxyl 112 mcg to 6 tabs/week  - check TSH and free T4 and total T3 in 2 months and prior to next visit          2. Obesity (BMI 30.0-34. 9): Had lost 12 lbs with starting phentermine in 7/16 but gained some back over Thanksgiving. Has stopped since being pregnant in 4/17 and then miscarried. Will stay off while still trying to conceive. Wt down 15 lbs from 1/18 to 7/18.  Up 21 lbs by 1/19 but is pregnant and delivered in 5/19 and up 12 lbs by 6/19. Down 19 lbs by 1/20 with keto diet but during pregnancy jayme 24 lbs by 10/20 and with starting Medi weight loss in 11/20 down 36 lbs by 4/21 to 170 and down to 159 in 11/21.  - cont medi weight loss  - cont diet and exercise        Patient Instructions   1) TSH is a thyroid test.  Your level is 0.16 which is low and below goal of 0.45-2.0. This test goes opposite of your thyroid dose and suggests your dose of levoxyl and cytomel is more than you need for your current weight.   I will decrease your dose of levoxyl back to 1 tab 6 days a week but stay on 1 tab 7 days a week of cytomel. 2) I put an order directly into the [facility] system to repeat your labs in 2 months and in the 1-2 weeks prior to your next visit so just ask for the order under my name and you will receive a courtesy reminder through [employer] to have these drawn. Follow-up and Dispositions    · Return in about 6 months (around 5/3/2022). Copy sent to:  Dr. Narendra Ann Score    Lab follow up: 01/11/22    Component      Latest Ref Rng & Units 1/10/2022 1/10/2022 1/10/2022           9:12 AM  9:12 AM  9:12 AM   T4, Free      0.82 - 1.77 ng/dL   1.35   T3, total      71 - 180 ng/dL  122    TSH      0.450 - 4.500 uIU/mL 0.605       Sent her the following message through [employer]:  TSH is a thyroid test.  Your level is 0.60 which is normal and at goal of 0.45-2.0. This test goes opposite of your thyroid dose and suggests your dose of levoxyl and cytomel is perfect so I will keep your dose the same.

## 2022-01-03 DIAGNOSIS — E03.9 ACQUIRED HYPOTHYROIDISM: ICD-10-CM

## 2022-01-03 DIAGNOSIS — E66.9 OBESITY (BMI 30.0-34.9): ICD-10-CM

## 2022-01-11 LAB
T3 SERPL-MCNC: 122 NG/DL (ref 71–180)
T4 FREE SERPL-MCNC: 1.35 NG/DL (ref 0.82–1.77)
TSH SERPL DL<=0.005 MIU/L-ACNC: 0.6 UIU/ML (ref 0.45–4.5)

## 2022-01-15 RX ORDER — LEVOTHYROXINE SODIUM 112 UG/1
TABLET ORAL
Qty: 90 TABLET | Refills: 3 | Status: SHIPPED | OUTPATIENT
Start: 2022-01-15

## 2022-03-19 PROBLEM — Z98.891 S/P CESAREAN SECTION: Status: ACTIVE | Noted: 2020-08-28

## 2022-04-19 RX ORDER — LIOTHYRONINE SODIUM 5 UG/1
TABLET ORAL
Qty: 90 TABLET | Refills: 3 | Status: SHIPPED | OUTPATIENT
Start: 2022-04-19

## 2022-05-01 DIAGNOSIS — E03.9 ACQUIRED HYPOTHYROIDISM: ICD-10-CM

## 2022-05-01 DIAGNOSIS — E66.9 OBESITY (BMI 30.0-34.9): ICD-10-CM

## 2022-05-12 LAB
T3 SERPL-MCNC: 154 NG/DL (ref 71–180)
T4 FREE SERPL-MCNC: 1.25 NG/DL (ref 0.82–1.77)
TSH SERPL DL<=0.005 MIU/L-ACNC: 0.99 UIU/ML (ref 0.45–4.5)

## 2022-05-18 ENCOUNTER — OFFICE VISIT (OUTPATIENT)
Dept: ENDOCRINOLOGY | Age: 42
End: 2022-05-18
Payer: COMMERCIAL

## 2022-05-18 VITALS
SYSTOLIC BLOOD PRESSURE: 90 MMHG | BODY MASS INDEX: 28.2 KG/M2 | HEART RATE: 73 BPM | HEIGHT: 64 IN | WEIGHT: 165.2 LBS | DIASTOLIC BLOOD PRESSURE: 54 MMHG

## 2022-05-18 DIAGNOSIS — E66.9 OBESITY (BMI 30.0-34.9): ICD-10-CM

## 2022-05-18 DIAGNOSIS — E03.9 ACQUIRED HYPOTHYROIDISM: Primary | ICD-10-CM

## 2022-05-18 PROCEDURE — 99214 OFFICE O/P EST MOD 30 MIN: CPT | Performed by: INTERNAL MEDICINE

## 2022-05-18 NOTE — PATIENT INSTRUCTIONS
1) TSH is a thyroid test.  Your level is 0.98 which is normal and at goal of 0.45-2.0. This test goes opposite of your thyroid dose and suggests your dose of levoxyl and cytomel is perfect so I will keep your dose the same. 2) I will plan on seeing you back in one year but if you feel you need to have your labs checked sooner, please let me know, especially if weight is down by 10 or more lbs under 155 lbs.

## 2022-05-18 NOTE — PROGRESS NOTES
Chief Complaint   Patient presents with    Thyroid Problem     pcp and pharmacy confirmed     History of Present Illness: Ewa Crawford is a 40 y.o. female here for follow up of thyroid. Weight up 6 lbs since last visit in 11/21. Has been taking levoxyl 1 tab on Mon-Sat and skips Sun at bedtime and takes the cytomel 1 tab daily in the morning. Hasn't missed any doses. Overall energy is pretty good for having 2 kids. Bowels are regular. Periods are regular on the OCP. No change in skin, hair, or nails. Occ hot flashes but mostly feels comfortable. No chest pain or palpitations or tremors. Will be going to [place] this summer to see her 's family. Of note, her  is also named Peyton Louis, which I was unaware of until today. Current Outpatient Medications   Medication Sig    montelukast sodium (SINGULAIR PO) Take 10 mg by mouth daily.  liothyronine (CYTOMEL) 5 mcg tablet TAKE 1 TABLET BY MOUTH DAILY    LevoxyL 112 mcg tablet TAKE 1 TABLET BY MOUTH 6 DAYS A WEEK AT BEDTIME    OTHER,NON-FORMULARY, Womens one a day vitamin    OTHER,NON-FORMULARY, as needed. Stool softner    Nortrel 0.5/35, 28, 0.5-35 mg-mcg tab TK 1 T PO QD     No current facility-administered medications for this visit.      Allergies   Allergen Reactions    Erythromycin Rash     Review of Systems: PER HPI    Physical Examination:  Blood pressure (!) 90/54, pulse 73, height 5' 4\" (1.626 m), weight 165 lb 3.2 oz (74.9 kg), unknown if currently breastfeeding.  - General: pleasant, no distress, good eye contact   - Neck: small goiter  - Cardiovascular: regular, normal rate, nl s1 and s2, no m/r/g   - Respiratory: clear to auscultation bilaterally   - Integumentary: skin is normal, no edema  - Neurological: reflexes 2+ at biceps, no tremors  - Psychiatric: normal mood and affect    Data Reviewed:   Component      Latest Ref Rng & Units 5/11/2022 5/11/2022 5/11/2022           9:07 AM  9:07 AM  9:07 AM   T4, Free 0.82 - 1.77 ng/dL   1.25   T3, total      71 - 180 ng/dL  154    TSH      0.450 - 4.500 uIU/mL 0.986         Assessment/Plan:     1. Acquired hypothyroidism: When she was 16, recalls having a lot of fatigue and was checked and found to have hypothyroidism and has been on medication ever since. Had been on levothyroxine 50 mcg daily for years and may have been on brand levoxyl as a teenager but not recently. Was taking this at the same time as her calcium and her OCP and also drinks coffee within 30 minutes. TSH was 2.59 in 7/15 and TPO ab was normal.  Increased her dose to 75 mcg daily and advised her to move the thyroid med away from the calcium and coffee to improve absorption and she has been taking at bedtime. TSH down to 1.36 in 1/16 and changed to Levoxyl and TSH 1.57 in 7/16. Down to 1.49 in 11/16 so increased dose to 88 mcg daily to try and get TSH < 1 to help with conception and became pregnant in 4/17 and I increased her dose to 1 tab on Mon-Fri and 2 tabs on Sat-Sun and TSH 1.08 in 5/17 so had her stay on this dose but then miscarried at the end of 5/17. Got pregnant again in 9/17 and miscarried in 10/17. TSH down to 0.46 in 1/18 so her current dose is still correct and not the reason for her miscarriages. I changed to 112 mcg tabs to take 1 tab daily to get a consistent dose in her system and allow for titration on the weekends when she becomes pregnant again and TSH down to 0.317 in 7/18 with losing 15 lbs so had her decrease to 6.5 tab/week. Became pregnant in 8/18 and increased her dose to 8.5 tabs/week and TSH 0.03 in 10/18 and decreased her dose to 8 tabs/week and later that month TSH up to 0.12 so decreased to 7.5 tabs/week and TSH up to 0.29 in 12/18 so kept dose the same. TSH down to 0.18 in 1/19 so decreased to 7 tabs/week and TSH 0.37 in 3/19 so kept dose the same.   Delivered on 5/3/19 and asked her to decrease dose to 6 tabs/week after delivery but she forgot and TSH 0.057 in 6/19 so did this then and TSH 1.1 in 8/19 and 0.93 in 1/20 so kept dose the same. She was 9 weeks pregnant at that time and her TSH remained normal on 6 tabs/week during pregnancy and delivered in 8/20. TSH down to 0.28 in 10/20 but she just started OCP after her labs were drawn so kept her dose the same. TSH 1.96 in 12/20 so increased back to 1 tab daily and TSH 0.72 in 4/21. Still having some fatigue so added cytomel 5 mcg daily and decreased levoxyl to 6 tabs/week and TSH 0.84 in 6/21 so kept dose the same. Went back to 7 tabs/week of levoxyl on accident and lost 11 lbs and TSH 0.16 in 10/21 so had her go back to 6 tabs/week of levoxyl and TSH 0.60 in 1/22 and 0.98 in 5/22 so kept dose the same. - cont cytomel 5 mcg daily  - cont levoxyl 112 mcg 6 tabs/week  - check TSH and free T4 and total T3 prior to next visit          2. Obesity (BMI 30.0-34. 9): Had lost 12 lbs with starting phentermine in 7/16 but gained some back over Thanksgiving. Has stopped since being pregnant in 4/17 and then miscarried. Will stay off while still trying to conceive. Wt down 15 lbs from 1/18 to 7/18.  Up 21 lbs by 1/19 but is pregnant and delivered in 5/19 and up 12 lbs by 6/19. Down 19 lbs by 1/20 with keto diet but during pregnancy jayme 24 lbs by 10/20 and with starting Medi weight loss in 11/20 down 36 lbs by 4/21 to 170 and down to 159 in 11/21. Up to 165 in 5/22.  - cont medi weight loss  - cont diet and exercise        Patient Instructions   1) TSH is a thyroid test.  Your level is 0.98 which is normal and at goal of 0.45-2.0. This test goes opposite of your thyroid dose and suggests your dose of levoxyl and cytomel is perfect so I will keep your dose the same. 2) I will plan on seeing you back in one year but if you feel you need to have your labs checked sooner, please let me know, especially if weight is down by 10 or more lbs under 155 lbs.         Follow-up and Dispositions    · Return in about 1 year (around 5/18/2023).                Copy sent to:  Dr. Anu Alvarez

## 2022-12-28 RX ORDER — LEVOTHYROXINE SODIUM 112 UG/1
TABLET ORAL
Qty: 90 TABLET | Refills: 3 | Status: SHIPPED | OUTPATIENT
Start: 2022-12-28

## 2023-04-23 DIAGNOSIS — E66.9 OBESITY (BMI 30.0-34.9): Primary | ICD-10-CM

## 2023-05-01 DIAGNOSIS — E03.9 ACQUIRED HYPOTHYROIDISM: ICD-10-CM

## 2023-05-01 DIAGNOSIS — E66.9 OBESITY (BMI 30.0-34.9): ICD-10-CM

## 2023-05-15 DIAGNOSIS — E03.9 ACQUIRED HYPOTHYROIDISM: Primary | ICD-10-CM

## 2023-05-17 ENCOUNTER — OFFICE VISIT (OUTPATIENT)
Age: 43
End: 2023-05-17
Payer: COMMERCIAL

## 2023-05-17 VITALS
HEIGHT: 64 IN | BODY MASS INDEX: 29.88 KG/M2 | HEART RATE: 63 BPM | WEIGHT: 175 LBS | SYSTOLIC BLOOD PRESSURE: 109 MMHG | DIASTOLIC BLOOD PRESSURE: 48 MMHG

## 2023-05-17 DIAGNOSIS — E03.9 ACQUIRED HYPOTHYROIDISM: Primary | ICD-10-CM

## 2023-05-17 DIAGNOSIS — E66.9 CLASS 1 OBESITY: ICD-10-CM

## 2023-05-17 LAB
T3 SERPL-MCNC: 135 NG/DL (ref 71–180)
T4 FREE SERPL-MCNC: 1.16 NG/DL (ref 0.82–1.77)
TSH SERPL DL<=0.005 MIU/L-ACNC: 0.83 UIU/ML (ref 0.45–4.5)

## 2023-05-17 PROCEDURE — 99214 OFFICE O/P EST MOD 30 MIN: CPT | Performed by: INTERNAL MEDICINE

## 2023-05-17 RX ORDER — LIOTHYRONINE SODIUM 5 UG/1
5 TABLET ORAL DAILY
COMMUNITY
Start: 2023-04-10

## 2023-05-17 RX ORDER — LEVOTHYROXINE SODIUM 112 UG/1
TABLET ORAL
COMMUNITY
Start: 2023-03-24

## 2023-05-17 RX ORDER — ASPIRIN 81 MG
TABLET, DELAYED RELEASE (ENTERIC COATED) ORAL DAILY
COMMUNITY

## 2023-05-17 RX ORDER — NORETHINDRONE AND ETHINYL ESTRADIOL 0.5-0.035
1 KIT ORAL DAILY
COMMUNITY
Start: 2023-04-23

## 2023-05-17 NOTE — PATIENT INSTRUCTIONS
1) Your TSH (thyroid test) is normal but if you want to try taking unithroid 6.5 tabs/week and stay on cytomel 7 tabs/week then you can do so. 2) Please watch out for any symptoms of hyperthyroidism that would suggest your dose is too much such as chest pain, heart racing, anxiety, tremors, trouble sleeping, feeling hot all the time, losing weight with out trying, hair loss, or diarrhea. If they occur, then I would go back to 6 tabs/week of unithroid.

## 2023-05-17 NOTE — PROGRESS NOTES
that time and her TSH remained normal on 6 tabs/week during pregnancy and delivered in 8/20. TSH down to 0.28 in 10/20 but she just started OCP after her labs were drawn so kept her dose the same. TSH 1.96 in 12/20 so increased back to 1 tab daily and TSH 0.72 in 4/21. Still having some fatigue so added cytomel 5 mcg daily and decreased levoxyl to 6 tabs/week and TSH 0.84 in 6/21 so kept dose the same. Went back to 7 tabs/week of levoxyl on accident and lost 11 lbs and TSH 0.16 in 10/21 so had her go back to 6 tabs/week of levoxyl and TSH 0.60 in 1/22 and 0.98 in 5/22 so kept dose the same. Changed to unithroid in 12/22 due to availability of levoxyl and  TSH 0.83 in 5/23 so  increased to 6.5 tabs/week to push TSH closer to 0.5.  - cont cytomel 5 mcg daily  - increase unithroid 112 mcg to 6.5 tabs/week  - check TSH and free T4 and total T3 prior to next visit          2. Obesity (BMI 30.0-34. 9): Had lost 12 lbs with starting phentermine in 7/16 but gained some back over Thanksgiving. Has stopped since being pregnant in 4/17 and then miscarried. Will stay off while still trying to conceive. Wt down 15 lbs from 1/18 to 7/18.  Up 21 lbs by 1/19 but is pregnant and delivered in 5/19 and up 12 lbs by 6/19. Down 19 lbs by 1/20 with keto diet but during pregnancy kristie 24 lbs by 10/20 and with starting Medi weight loss in 11/20 down 36 lbs by 4/21 to 170 and down to 159 in 11/21. Up to 165 in 5/22 and 175 in 5/23.  - cont medi weight loss  - cont diet and exercise          Patient Instructions   1) Your TSH (thyroid test) is normal but if you want to try taking unithroid 6.5 tabs/week and stay on cytomel 7 tabs/week then you can do so. 2) Please watch out for any symptoms of hyperthyroidism that would suggest your dose is too much such as chest pain, heart racing, anxiety, tremors, trouble sleeping, feeling hot all the time, losing weight with out trying, hair loss, or diarrhea.   If they occur, then I would go

## 2023-05-20 RX ORDER — LIOTHYRONINE SODIUM 5 UG/1
1 TABLET ORAL DAILY
COMMUNITY
Start: 2023-04-10

## 2023-05-20 RX ORDER — NORETHINDRONE AND ETHINYL ESTRADIOL 0.5-0.035
1 KIT ORAL DAILY
COMMUNITY
Start: 2020-10-12

## 2023-05-20 RX ORDER — LEVOTHYROXINE SODIUM 112 UG/1
TABLET ORAL
COMMUNITY
Start: 2022-12-28

## 2024-01-24 RX ORDER — LEVOTHYROXINE SODIUM 112 UG/1
TABLET ORAL
Qty: 90 TABLET | Refills: 3 | Status: SHIPPED | OUTPATIENT
Start: 2024-01-24

## 2024-03-31 RX ORDER — LIOTHYRONINE SODIUM 5 UG/1
5 TABLET ORAL DAILY
Qty: 90 TABLET | Refills: 0 | Status: SHIPPED | OUTPATIENT
Start: 2024-03-31

## 2024-05-01 DIAGNOSIS — E03.9 ACQUIRED HYPOTHYROIDISM: ICD-10-CM

## 2024-05-11 LAB
T3 SERPL-MCNC: 128 NG/DL (ref 71–180)
T4 FREE SERPL-MCNC: 1.18 NG/DL (ref 0.82–1.77)
TSH SERPL DL<=0.005 MIU/L-ACNC: 0.1 UIU/ML (ref 0.45–4.5)

## 2024-05-15 ENCOUNTER — OFFICE VISIT (OUTPATIENT)
Age: 44
End: 2024-05-15
Payer: COMMERCIAL

## 2024-05-15 VITALS
HEART RATE: 55 BPM | BODY MASS INDEX: 29.53 KG/M2 | WEIGHT: 173 LBS | SYSTOLIC BLOOD PRESSURE: 109 MMHG | HEIGHT: 64 IN | DIASTOLIC BLOOD PRESSURE: 60 MMHG

## 2024-05-15 DIAGNOSIS — E66.9 CLASS 1 OBESITY: ICD-10-CM

## 2024-05-15 DIAGNOSIS — E03.9 ACQUIRED HYPOTHYROIDISM: Primary | ICD-10-CM

## 2024-05-15 PROCEDURE — 99214 OFFICE O/P EST MOD 30 MIN: CPT | Performed by: INTERNAL MEDICINE

## 2024-05-15 RX ORDER — LEVOTHYROXINE SODIUM 112 UG/1
TABLET ORAL
Qty: 90 TABLET | Refills: 3
Start: 2024-05-15

## 2024-05-15 NOTE — PROGRESS NOTES
your med list but did not send a new prescription to your pharmacy on file that has been filling this med.    2) Keep taking cytomel 1 tab 7 days a week.    3) Please repeat your labs in 6-8 weeks.   I will send you a message through [employer] with your lab results.    4) I will plan on seeing you back in one year but if you feel you need to have your labs checked sooner, please let me know.        Return in about 1 year (around 5/15/2025).     Copy sent to:  Tj Gross MD Dr. Kreis

## 2024-05-15 NOTE — PATIENT INSTRUCTIONS
1) TSH is a thyroid test.  Your level is 0.09 which is low and below goal of 0.45-1.0.  The TSH goes opposite of your thyroid dose and suggests your dose of unithroid is slightly more than you need.  I will decrease your dose to 1 tab on Mon-Sat and NONE on Sun.   I updated your med list but did not send a new prescription to your pharmacy on file that has been filling this med.    2) Keep taking cytomel 1 tab 7 days a week.    3) Please repeat your labs in 6-8 weeks.   I will send you a message through [facility] with your lab results.    4) I will plan on seeing you back in one year but if you feel you need to have your labs checked sooner, please let me know.

## 2024-06-26 DIAGNOSIS — E03.9 ACQUIRED HYPOTHYROIDISM: ICD-10-CM

## 2024-06-26 DIAGNOSIS — E66.9 CLASS 1 OBESITY: ICD-10-CM

## 2024-06-28 LAB
T3 SERPL-MCNC: 112 NG/DL (ref 71–180)
T4 FREE SERPL-MCNC: 1.13 NG/DL (ref 0.82–1.77)
TSH SERPL DL<=0.005 MIU/L-ACNC: 0.61 UIU/ML (ref 0.45–4.5)

## 2024-06-28 RX ORDER — LIOTHYRONINE SODIUM 5 UG/1
5 TABLET ORAL DAILY
Qty: 90 TABLET | Refills: 3 | Status: SHIPPED | OUTPATIENT
Start: 2024-06-28

## 2025-02-25 RX ORDER — LEVOTHYROXINE SODIUM 112 UG/1
TABLET ORAL
Qty: 90 TABLET | Refills: 3 | Status: SHIPPED | OUTPATIENT
Start: 2025-02-25

## 2025-05-01 DIAGNOSIS — E66.811 CLASS 1 OBESITY: ICD-10-CM

## 2025-05-01 DIAGNOSIS — E03.9 ACQUIRED HYPOTHYROIDISM: ICD-10-CM

## 2025-05-20 LAB
T3 SERPL-MCNC: 126 NG/DL (ref 71–180)
T4 FREE SERPL-MCNC: 1.08 NG/DL (ref 0.82–1.77)
TSH SERPL DL<=0.005 MIU/L-ACNC: 0.52 UIU/ML (ref 0.45–4.5)

## 2025-05-21 ENCOUNTER — OFFICE VISIT (OUTPATIENT)
Age: 45
End: 2025-05-21
Payer: COMMERCIAL

## 2025-05-21 ENCOUNTER — RESULTS FOLLOW-UP (OUTPATIENT)
Age: 45
End: 2025-05-21

## 2025-05-21 VITALS
WEIGHT: 162 LBS | HEIGHT: 64 IN | HEART RATE: 56 BPM | BODY MASS INDEX: 27.66 KG/M2 | SYSTOLIC BLOOD PRESSURE: 103 MMHG | DIASTOLIC BLOOD PRESSURE: 64 MMHG

## 2025-05-21 DIAGNOSIS — E66.3 OVERWEIGHT: ICD-10-CM

## 2025-05-21 DIAGNOSIS — E03.9 ACQUIRED HYPOTHYROIDISM: Primary | ICD-10-CM

## 2025-05-21 PROCEDURE — 99214 OFFICE O/P EST MOD 30 MIN: CPT | Performed by: INTERNAL MEDICINE

## 2025-05-21 NOTE — PROGRESS NOTES
at bedtime.  TSH down to 1.36 in 1/16 and changed to Levoxyl and TSH 1.57 in 7/16.  Down to 1.49 in 11/16 so increased dose to 88 mcg daily to try and get TSH < 1 to help with conception and became pregnant in 4/17 and I increased her dose to 1 tab on Mon-Fri and 2 tabs on Sat-Sun and TSH 1.08 in 5/17 so had her stay on this dose but then miscarried at the end of 5/17.  Got pregnant again in 9/17 and miscarried in 10/17.  TSH down to 0.46 in 1/18 so her current dose is still correct and not the reason for her miscarriages. I changed to 112 mcg tabs to take 1 tab daily to get a consistent dose in her system and allow for titration on the weekends when she becomes pregnant again and TSH down to 0.317 in 7/18 with losing 15 lbs so had her decrease to 6.5 tab/week.  Became pregnant in 8/18 and increased her dose to 8.5 tabs/week and TSH 0.03 in 10/18 and decreased her dose to 8 tabs/week and later that month TSH up to 0.12 so decreased to 7.5 tabs/week and TSH up to 0.29 in 12/18 so kept dose the same.  TSH down to 0.18 in 1/19 so decreased to 7 tabs/week and TSH 0.37 in 3/19 so kept dose the same.  Delivered on 5/3/19 and asked her to decrease dose to 6 tabs/week after delivery but she forgot and TSH 0.057 in 6/19 so did this then and TSH 1.1 in 8/19 and 0.93 in 1/20 so kept dose the same.  She was 9 weeks pregnant at that time and her TSH remained normal on 6 tabs/week during pregnancy and delivered in 8/20.  TSH down to 0.28 in 10/20 but she just started OCP after her labs were drawn so kept her dose the same.  TSH 1.96 in 12/20 so increased back to 1 tab daily and TSH 0.72 in 4/21.  Still having some fatigue so added cytomel 5 mcg daily and decreased levoxyl to 6 tabs/week and TSH 0.84 in 6/21 so kept dose the same.  Went back to 7 tabs/week of levoxyl on accident and lost 11 lbs and TSH 0.16 in 10/21 so had her go back to 6 tabs/week of levoxyl and TSH 0.60 in 1/22 and 0.98 in 5/22 so kept dose the same.  Changed

## 2025-05-21 NOTE — PATIENT INSTRUCTIONS
1) TSH is a thyroid test.  Your level is 0.5 which is normal and at goal of 0.45-2.0.  The TSH goes opposite of your thyroid dose and suggests your dose of unithroid and liothyronine is perfect so I will keep your dose the same.    2) I will plan on seeing you back in one year but if you feel you need to have your labs checked sooner, please let me know, especially if your weight is down another 10 lbs.  Please watch out for any symptoms of hyperthyroidism that would suggest your dose is too much such as chest pain, heart racing, anxiety, tremors, trouble sleeping, feeling hot all the time, hair loss, or diarrhea.  If they occur, let me know and we can check your labs sooner to see if a dose decrease is needed.

## 2025-06-27 RX ORDER — LIOTHYRONINE SODIUM 5 UG/1
5 TABLET ORAL DAILY
Qty: 90 TABLET | Refills: 3 | Status: SHIPPED | OUTPATIENT
Start: 2025-06-27

## (undated) DEVICE — ROYALSILK SURGICAL GOWN, L: Brand: CONVERTORS

## (undated) DEVICE — ELECTROSURGICAL DEVICE HOLSTER;FOR USE WITH MAXIMUM PEAK VOLTAGE OF 4000 V: Brand: FORCE TRIVERSE

## (undated) DEVICE — SOLUTION IV 1000ML 0.9% SOD CHL

## (undated) DEVICE — STERILE POLYISOPRENE POWDER-FREE SURGICAL GLOVES: Brand: PROTEXIS

## (undated) DEVICE — COVERALL PREM SMS 2XL KNIT --

## (undated) DEVICE — 3M™ TEGADERM™ TRANSPARENT FILM DRESSING FRAME STYLE, 1628, 6 IN X 8 IN (15 CM X 20 CM), 10/CT 8CT/CASE: Brand: 3M™ TEGADERM™

## (undated) DEVICE — 3000CC GUARDIAN II: Brand: GUARDIAN

## (undated) DEVICE — DRAPE FLD WRM W44XL66IN C6L FOR INTRATEMP SYS THERMABASIN

## (undated) DEVICE — MEDI-VAC NON-CONDUCTIVE SUCTION TUBING: Brand: CARDINAL HEALTH

## (undated) DEVICE — Device: Brand: PORTEX

## (undated) DEVICE — DRESSING AQUACEL ADVANTAGE ALG W4XL5IN RECT GREATER ABSRB HYDRFBR TECHNOLOGY

## (undated) DEVICE — REM POLYHESIVE ADULT PATIENT RETURN ELECTRODE: Brand: VALLEYLAB

## (undated) DEVICE — SPINAL TRAY EPIDURAL KT FLEXTIP +

## (undated) DEVICE — DEVON™ KNEE AND BODY STRAP 60" X 3" (1.5 M X 7.6 CM): Brand: DEVON

## (undated) DEVICE — PACK PROCEDURE SURG C SECT KT SMH

## (undated) DEVICE — SOLUTION IRRIG 1000ML H2O STRL BLT

## (undated) DEVICE — (D)PREP SKN CHLRAPRP APPL 26ML -- CONVERT TO ITEM 371833

## (undated) DEVICE — KENDALL SCD EXPRESS SLEEVES, KNEE LENGTH, MEDIUM: Brand: KENDALL SCD

## (undated) DEVICE — SUTURE PLN GUT SZ 2-0 L27IN ABSRB YELLOWISH TAN L70MM XLH 53T

## (undated) DEVICE — Z DISCONTINUED PER MEDLINE DRESSING ALG W9XL10CM SIL CVR WTRPRF VIR BACT BARR ANTIMIC

## (undated) DEVICE — CATH FOLEY 16F LUBRI-SIL IC --

## (undated) DEVICE — SOLIDIFIER FLUID 3000 CC ABSORB

## (undated) DEVICE — TIP CLEANER: Brand: VALLEYLAB

## (undated) DEVICE — LIGHT HANDLE: Brand: DEVON

## (undated) DEVICE — ANESTHESIA TRAY SPNL W/O NDL PENCAN

## (undated) DEVICE — STERILE POLYISOPRENE POWDER-FREE SURGICAL GLOVES WITH EMOLLIENT COATING: Brand: PROTEXIS

## (undated) DEVICE — SOLIDIFIER MEDC 1200ML -- CONVERT TO 356117

## (undated) DEVICE — SUTURE MCRYL SZ 0 L36IN ABSRB UD L36MM CT-1 1/2 CIR Y946H

## (undated) DEVICE — STAPLER SKIN SQ 30 ABSRB STPL DISP INSORB

## (undated) DEVICE — SUTURE VCRL SZ 2-0 L36IN ABSRB VLT L36MM CT-1 1/2 CIR J345H

## (undated) DEVICE — SUTURE MCRYL SZ 2-0 L36IN ABSRB UD L36MM CT-1 1/2 CIR Y945H

## (undated) DEVICE — SUTURE VCRL SZ 0 L36IN ABSRB VLT L40MM CT 1/2 CIR J358H

## (undated) DEVICE — ROYAL SILK SURGICAL GOWN, XXL: Brand: CONVERTORS

## (undated) DEVICE — SUTURE VCRL SZ 1 L36IN ABSRB VLT L48MM CTX 1/2 CIR J371H